# Patient Record
Sex: FEMALE | Race: WHITE | NOT HISPANIC OR LATINO | URBAN - METROPOLITAN AREA
[De-identification: names, ages, dates, MRNs, and addresses within clinical notes are randomized per-mention and may not be internally consistent; named-entity substitution may affect disease eponyms.]

---

## 2019-01-10 ENCOUNTER — EMERGENCY (EMERGENCY)
Facility: HOSPITAL | Age: 69
LOS: 1 days | Discharge: HOME | End: 2019-01-10
Admitting: EMERGENCY MEDICINE
Payer: MEDICARE

## 2019-01-10 VITALS
OXYGEN SATURATION: 98 % | DIASTOLIC BLOOD PRESSURE: 72 MMHG | SYSTOLIC BLOOD PRESSURE: 167 MMHG | RESPIRATION RATE: 18 BRPM | HEART RATE: 80 BPM | TEMPERATURE: 98 F

## 2019-01-10 PROCEDURE — 99283 EMERGENCY DEPT VISIT LOW MDM: CPT

## 2019-01-10 RX ORDER — AZITHROMYCIN 500 MG/1
1 TABLET, FILM COATED ORAL
Qty: 6 | Refills: 0 | OUTPATIENT
Start: 2019-01-10 | End: 2019-01-14

## 2019-01-10 NOTE — ED ADULT NURSE NOTE - NSIMPLEMENTINTERV_GEN_ALL_ED
Implemented All Universal Safety Interventions:  [facility] to call system. Call bell, personal items and telephone within reach. Instruct patient to call for assistance. Room bathroom lighting operational. Non-slip footwear when patient is off stretcher. Physically safe environment: no spills, clutter or unnecessary equipment. Stretcher in lowest position, wheels locked, appropriate side rails in place.

## 2019-01-10 NOTE — ED PROVIDER NOTE - OBJECTIVE STATEMENT
67 y/o female with PMHx of asthma (on albuterol inhaler, uses nebulizer and Prednisone as needed), hypothyroidism, and bronchitis presents to ED c/o cough for the past 2 weeks. Patient reports productive cough with yellow sputum with associated wheezing and slight hoarseness but no fever or chills. Also reports an episode of emesis induced by coughing. States she has used albuterol inhaler, nebulizer, Prednisone, NyQuil, and Mucinex with slight symptomatic relief, but symptoms have persisted. Denies any nausea and CP. Reports that she gets bronchitis every year around this time.

## 2019-01-10 NOTE — ED PROVIDER NOTE - MEDICAL DECISION MAKING DETAILS
Never
URI, bronchitis. Patient is afebrile, well-appearing, VS normal. Will give steroids, cough meds, and Z-Murray, advised f/u with PMD.

## 2019-01-10 NOTE — ED PROVIDER NOTE - CONTEXT
pt states that she gets bronchitis around this time every year, has hx of asthma/known exposure (describe)

## 2019-01-10 NOTE — ED ADULT NURSE NOTE - OBJECTIVE STATEMENT
c/o productive cough x 2 weeks. denies fevers/chills, CP, trouble breathing. pt in NAD, resting comfortably, even nonlabored breathing, speaking in full sentences. will continue to monitor.

## 2019-01-14 DIAGNOSIS — Z88.0 ALLERGY STATUS TO PENICILLIN: ICD-10-CM

## 2019-01-14 DIAGNOSIS — E03.9 HYPOTHYROIDISM, UNSPECIFIED: ICD-10-CM

## 2019-01-14 DIAGNOSIS — R05 COUGH: ICD-10-CM

## 2019-01-14 DIAGNOSIS — J40 BRONCHITIS, NOT SPECIFIED AS ACUTE OR CHRONIC: ICD-10-CM

## 2019-01-14 DIAGNOSIS — Z91.040 LATEX ALLERGY STATUS: ICD-10-CM

## 2019-07-08 ENCOUNTER — HOSPITAL ENCOUNTER (OUTPATIENT)
Dept: PHYSICAL THERAPY | Age: 69
Setting detail: THERAPIES SERIES
Discharge: HOME | End: 2019-07-08
Payer: MEDICARE

## 2019-07-08 PROCEDURE — 97016 VASOPNEUMATIC DEVICE THERAPY: CPT

## 2019-07-08 PROCEDURE — 97161 PT EVAL LOW COMPLEX 20 MIN: CPT

## 2019-07-08 PROCEDURE — 97110 THERAPEUTIC EXERCISES: CPT

## 2019-07-08 NOTE — PLAN OF CARE
[address] [facility], [address] [facility] [place], [address] [facility] [address]  Phone: [phone]   Fax:     [phone]       Physical Therapy Certification    Dear Referring Practitioner: Dr. Higinio Hamman,    We had the pleasure of evaluating the following patient for physical therapy services at [address] [place] [address]. A summary of our findings can be found in the initial assessment below. This includes our plan of care. If you have any questions or concerns regarding these findings, please do not hesitate to contact me at the office phone number checked above. Thank you for the referral.       Physician Signature:_______________________________Date:__________________  By signing above (or electronic signature), therapists plan is approved by physician      Patient: Miles Briones   : 1950   MRN: [id number]  Referring Physician: Referring Practitioner: Dr. Higinio Hamman      Evaluation Date: 2019      Medical Diagnosis Information:  Diagnosis: L knee OA M17.12  s/p L TKA  SX: 19  Treatment Diagnosis: L knee pain M25.562                                         Insurance information: PT Insurance Information: Medicare     Precautions/ Contra-indications: s/p L TKA 19  Latex Allergy:  [x]NO      []YES  Preferred Language for Healthcare:   [x]English       []other:    SUBJECTIVE: Patient stated complaint: Had L TKA 19 in [place]. Had 2 PT sessions prior to coming to [place], has been doing some bending since but stated no other exercises. Is , usually walks most of the day. Off work and staying with friends/family in [facility] plans to return to [place] labor day weekend.      Relevant Medical History: B knee scopes , R hip surgery vessel to hip, R RTC repair   Functional Disability Index: LEFS 83% disability    Pain Scale: 6-7/10  Easing factors: rest, ice  Provocative factors: bending    Type: [x]Constant   []Intermittent  []Radiating []Localized []other:     Numbness/Tingling: in L foot at times     Occupation/School:     Living Status/Prior Level of Function: Independent with ADLs and IADLs. OBJECTIVE:     ROM LEFT RIGHT   HIP Flex     HIP Abd     HIP Ext     HIP IR     HIP ER     Knee ext 0 hyper   Knee Flex 92 125   Ankle PF     Ankle DF     Ankle In     Ankle Ev     Strength  LEFT RIGHT   HIP Flexors SLR with lag    HIP Abductors     HIP Ext     Hip ER     Knee EXT (quad) fair    Knee Flex (HS)     Ankle DF     Ankle PF     Ankle Inv     Ankle EV          Circumference  Mid apex  7 cm prox             Reflexes/Sensation:    [x]Dermatomes/Myotomes intact    [x]Reflexes equal and normal bilaterally   []Other:    Joint mobility:    []Normal    []Hypo   []Hyper    Palpation: non TTP per pt    Functional Mobility/Transfers: I with transfers, difficulty with car transfers    Posture: rolled shoulder, slight forward flexed posture    Bandages/Dressings/Incisions: healed    Gait: (include devices/WB status) I with WW, step through antalgic gait favors RLE    Orthopedic Special Tests: NA                       [x] Patient history, allergies, meds reviewed. Medical chart reviewed. See intake form. Review Of Systems (ROS):  [x]Performed Review of systems (Integumentary, CardioPulmonary, Neurological) by intake and observation. Intake form has been scanned into medical record. Patient has been instructed to contact their primary care physician regarding ROS issues if not already being addressed at this time.       Co-morbidities/Complexities (which will affect course of rehabilitation):    []None           Arthritic conditions   []Rheumatoid arthritis (M05.9)  []Osteoarthritis (M19.91)   Cardiovascular conditions   [x]Hypertension (I10)  []Hyperlipidemia (E78.5)  []Angina pectoris (I20)  []Atherosclerosis (I70)   Musculoskeletal conditions   []Disc

## 2019-07-08 NOTE — FLOWSHEET NOTE
[address] [place] [address] [facility]    Physical Therapy Daily Treatment Note  Date:  2019    Patient Name:  Gunner Goss    :  1950  MRN: [id number]  Restrictions/Precautions:    Medical/Treatment Diagnosis Information:  · Diagnosis: L knee OA M17.12  s/p L TKA  SX: 19  · Treatment Diagnosis: L knee pain R68.245  Insurance/Certification information:  PT Insurance Information: Medicare  Physician Information:  Referring Practitioner: Dr. Hulen Scheuermann of care signed (Y/N):     Date of Patient follow up with Physician:     G-Code (if applicable):      Date G-Code Applied:      LEFS 83% disability    Progress Note: [x]  Yes  []  No  Next due by: Visit #10       Latex Allergy:  []NO      [x]YES   Preferred Language for Healthcare:   [x]English       []other:    Visit # Insurance Allowable   1 Med Nec     Pain level:  6-7/10     SUBJECTIVE:  See eval    OBJECTIVE: See eval  Observation:   Test measurements:      RESTRICTIONS/PRECAUTIONS:  s/p L TKA  SX: 19    Exercises/Interventions:     Therapeutic Ex Sets/sec Reps Notes HEP   Bike ROM  Calf stretch belt 6'  30\"   3     HS stretch long sitting 30\" 3     Quad sets 10\" 10     Heel slides  Self knee flexion self mob in chair  5   Reviewed    SLR  10     SSLR  10     LAQ  10                                                             Pt education 8'  HEP with progression, goals of PT, focus on ROM initially, use of ice/elevation- pt stated understanding     Manual Intervention                                                 NMR re-education                                                                          Therapeutic Exercise and NMR EXR  [x] (98904) Provided verbal/tactile cueing for activities related to strengthening, flexibility, endurance, ROM for improvements in LE, proximal hip, and core control with self care, mobility, lifting, ambulation.  [] (94419) Provided verbal/tactile cueing for activities

## 2019-07-12 ENCOUNTER — HOSPITAL ENCOUNTER (OUTPATIENT)
Dept: PHYSICAL THERAPY | Age: 69
Setting detail: THERAPIES SERIES
Discharge: HOME | End: 2019-07-12
Payer: MEDICARE

## 2019-07-12 PROCEDURE — 97112 NEUROMUSCULAR REEDUCATION: CPT

## 2019-07-12 PROCEDURE — 97110 THERAPEUTIC EXERCISES: CPT

## 2019-07-12 PROCEDURE — 97016 VASOPNEUMATIC DEVICE THERAPY: CPT

## 2019-07-12 NOTE — FLOWSHEET NOTE
verbal/tactile cueing for activities related to strengthening, flexibility, endurance, ROM for improvements in LE, proximal hip, and core control with self care, mobility, lifting, ambulation.  [] (79186) Provided verbal/tactile cueing for activities related to improving balance, coordination, kinesthetic sense, posture, motor skill, proprioception  to assist with LE, proximal hip, and core control in self care, mobility, lifting, ambulation and eccentric single leg control.      NMR and Therapeutic Activities:    [x] (59499 or 50377) Provided verbal/tactile cueing for activities related to improving balance, coordination, kinesthetic sense, posture, motor skill, proprioception and motor activation to allow for proper function of core, proximal hip and LE with self care and ADLs  [] (41831) Gait Re-education- Provided training and instruction to the patient for proper LE, core and proximal hip recruitment and positioning and eccentric body weight control with ambulation re-education including up and down stairs     Home Exercise Program:    [x] (35425) Reviewed/Progressed HEP activities related to strengthening, flexibility, endurance, ROM of core, proximal hip and LE for functional self-care, mobility, lifting and ambulation/stair navigation   [] (45175)Reviewed/Progressed HEP activities related to improving balance, coordination, kinesthetic sense, posture, motor skill, proprioception of core, proximal hip and LE for self care, mobility, lifting, and ambulation/stair navigation      Manual Treatments:  PROM / STM / Oscillations-Mobs:  G-I, II, III, IV (PA's, Inf., Post.)  [] (18042) Provided manual therapy to mobilize LE, proximal hip and/or LS spine soft tissue/joints for the purpose of modulating pain, promoting relaxation,  increasing ROM, reducing/eliminating soft tissue swelling/inflammation/restriction, improving soft tissue extensibility and allowing for proper ROM for normal function with self care, some ex without complaints. Added to HEP.     Treatment/Activity Tolerance:  [x] Patient tolerated treatment well [] Patient limited by fatique  [x] Patient limited by pain  [] Patient limited by other medical complications  [] Other:     Prognosis: [x] Good [] Fair  [] Poor    Patient Requires Follow-up: [x] Yes  [] No    PLAN: See eval  [x] Continue per plan of care [] Alter current plan (see comments)  [] Plan of care initiated [] Hold pending MD visit [] Discharge    Electronically signed by: Jona Eldridge PT, DPT

## 2019-07-15 ENCOUNTER — HOSPITAL ENCOUNTER (OUTPATIENT)
Dept: PHYSICAL THERAPY | Age: 69
Setting detail: THERAPIES SERIES
Discharge: HOME | End: 2019-07-15
Payer: MEDICARE

## 2019-07-15 PROCEDURE — 97016 VASOPNEUMATIC DEVICE THERAPY: CPT

## 2019-07-15 PROCEDURE — 97112 NEUROMUSCULAR REEDUCATION: CPT

## 2019-07-15 PROCEDURE — 97110 THERAPEUTIC EXERCISES: CPT

## 2019-07-18 ENCOUNTER — APPOINTMENT (OUTPATIENT)
Dept: PHYSICAL THERAPY | Age: 69
End: 2019-07-18
Payer: MEDICARE

## 2019-07-22 ENCOUNTER — APPOINTMENT (OUTPATIENT)
Dept: PHYSICAL THERAPY | Age: 69
End: 2019-07-22
Payer: MEDICARE

## 2019-07-23 ENCOUNTER — HOSPITAL ENCOUNTER (OUTPATIENT)
Dept: PHYSICAL THERAPY | Age: 69
Setting detail: THERAPIES SERIES
Discharge: HOME | End: 2019-07-23
Payer: MEDICARE

## 2019-07-23 PROCEDURE — 97112 NEUROMUSCULAR REEDUCATION: CPT

## 2019-07-23 PROCEDURE — 97110 THERAPEUTIC EXERCISES: CPT

## 2019-07-25 ENCOUNTER — HOSPITAL ENCOUNTER (OUTPATIENT)
Dept: PHYSICAL THERAPY | Age: 69
Setting detail: THERAPIES SERIES
Discharge: HOME | End: 2019-07-25
Payer: MEDICARE

## 2019-07-25 PROCEDURE — 97110 THERAPEUTIC EXERCISES: CPT

## 2019-07-25 PROCEDURE — 97016 VASOPNEUMATIC DEVICE THERAPY: CPT

## 2019-07-25 PROCEDURE — 97112 NEUROMUSCULAR REEDUCATION: CPT

## 2019-07-29 ENCOUNTER — HOSPITAL ENCOUNTER (OUTPATIENT)
Dept: PHYSICAL THERAPY | Age: 69
Setting detail: THERAPIES SERIES
Discharge: HOME | End: 2019-07-29
Payer: MEDICARE

## 2019-07-29 PROCEDURE — 97112 NEUROMUSCULAR REEDUCATION: CPT

## 2019-07-29 PROCEDURE — 97110 THERAPEUTIC EXERCISES: CPT

## 2019-07-29 PROCEDURE — 97016 VASOPNEUMATIC DEVICE THERAPY: CPT

## 2019-07-29 NOTE — FLOWSHEET NOTE
[address] [place] [address] [facility]    Physical Therapy Daily Treatment Note  Date:  2019    Patient Name:  Italo Hurtado    :  1950  MRN: [id number]  Restrictions/Precautions:    Medical/Treatment Diagnosis Information:  · Diagnosis: L knee OA M17.12  s/p L TKA  SX: 19  · Treatment Diagnosis: L knee pain O95.932  Insurance/Certification information:  PT Insurance Information: Medicare  Physician Information:  Referring Practitioner: Dr. Liliam Zeng of care signed (Y/N):     Date of Patient follow up with Physician:     G-Code (if applicable):      Date G-Code Applied:      LEFS 83% disability    Progress Note: [x]  Yes  []  No  Next due by: Visit #10       Latex Allergy:  []NO      [x]YES   Preferred Language for Healthcare:   [x]English       []other:    Visit # Insurance Allowable   6 Med Nec     Pain level:  3/10     SUBJECTIVE:    Knee feels pretty good minimal complaints.      OBJECTIVE: See eval  Observation:   Test measurements:  0-114 19    RESTRICTIONS/PRECAUTIONS:  s/p L TKA  SX: 19    Exercises/Interventions:     Therapeutic Ex Sets/sec Reps Notes HEP   Bike ROM  Calf stretch belt 6'  30\"   3     HS stretch long sitting 30\" 3     Quad sets 10\"      Heel slides  Self knee flexion self mob in chair  5   Reviewed    SLR      SSLR  Bridge        Leg press  SL leg press   Knee extension  HS curl  Step ups  Single leg balance  Calf stretch incline/HR's   KETURAH ABD/EXT  TKE 65#  40#  10#  20#  6\"  5\"  30\"x3  30#  35# 30  20  30  30  20  10  3x10  2x15ea  30               Gait training  10'  SBA with SPC, max cues for sequencing, decreasing antalgic gait, safety                                                     Pt education 8'  HEP with progression, goals of PT, focus on ROM initially, use of ice/elevation- pt stated understanding     Manual Intervention       Patella mobs 5'                                         NMR re-education Therapeutic Exercise and NMR EXR  [x] (38390) Provided verbal/tactile cueing for activities related to strengthening, flexibility, endurance, ROM for improvements in LE, proximal hip, and core control with self care, mobility, lifting, ambulation.  [] (58717) Provided verbal/tactile cueing for activities related to improving balance, coordination, kinesthetic sense, posture, motor skill, proprioception  to assist with LE, proximal hip, and core control in self care, mobility, lifting, ambulation and eccentric single leg control.      NMR and Therapeutic Activities:    [x] (22827 or 36063) Provided verbal/tactile cueing for activities related to improving balance, coordination, kinesthetic sense, posture, motor skill, proprioception and motor activation to allow for proper function of core, proximal hip and LE with self care and ADLs  [] (78649) Gait Re-education- Provided training and instruction to the patient for proper LE, core and proximal hip recruitment and positioning and eccentric body weight control with ambulation re-education including up and down stairs     Home Exercise Program:    [x] (25435) Reviewed/Progressed HEP activities related to strengthening, flexibility, endurance, ROM of core, proximal hip and LE for functional self-care, mobility, lifting and ambulation/stair navigation   [] (16457)Reviewed/Progressed HEP activities related to improving balance, coordination, kinesthetic sense, posture, motor skill, proprioception of core, proximal hip and LE for self care, mobility, lifting, and ambulation/stair navigation      Manual Treatments:  PROM / STM / Oscillations-Mobs:  G-I, II, III, IV (PA's, Inf., Post.)  [] (80152) Provided manual therapy to mobilize LE, proximal hip and/or LS spine soft tissue/joints for the purpose of modulating pain, promoting relaxation,  increasing ROM, reducing/eliminating soft tissue 20 Hour(s) 35 Minute(s) implemented, too soon to assess goals progression  [] Other:     ASSESSMENT:  ROM gradually progressing. Held knee ext 2/2 pt report of pain when attempted, performed the rest of routine without complaints. Pt reported it is impossible for her to do ex at home 2/2 weather and difficulty breathing outside, reviewed techniques for increased compliance.     Treatment/Activity Tolerance:  [x] Patient tolerated treatment well [] Patient limited by fatique  [x] Patient limited by pain  [] Patient limited by other medical complications  [] Other:     Prognosis: [x] Good [] Fair  [] Poor    Patient Requires Follow-up: [x] Yes  [] No    PLAN: See eval  [x] Continue per plan of care [] Alter current plan (see comments)  [] Plan of care initiated [] Hold pending MD visit [] Discharge    Electronically signed by: Lazaro Pelayo PT, DPT

## 2019-08-01 ENCOUNTER — APPOINTMENT (OUTPATIENT)
Dept: PHYSICAL THERAPY | Age: 69
End: 2019-08-01
Payer: MEDICARE

## 2019-08-01 ENCOUNTER — HOSPITAL ENCOUNTER (OUTPATIENT)
Dept: PHYSICAL THERAPY | Age: 69
Setting detail: THERAPIES SERIES
Discharge: HOME | End: 2019-08-01
Payer: MEDICARE

## 2019-08-01 PROCEDURE — 97110 THERAPEUTIC EXERCISES: CPT

## 2019-08-01 PROCEDURE — 97016 VASOPNEUMATIC DEVICE THERAPY: CPT

## 2019-08-01 NOTE — FLOWSHEET NOTE
[address] [place] [address] [facility]    Physical Therapy Daily Treatment Note  Date:  2019    Patient Name:  Kvng Posey    :  1950  MRN: [id number]  Restrictions/Precautions:    Medical/Treatment Diagnosis Information:  · Diagnosis: L knee OA M17.12  s/p L TKA  SX: 19  · Treatment Diagnosis: L knee pain V57.240  Insurance/Certification information:  PT Insurance Information: Medicare  Physician Information:  Referring Practitioner: Dr. Theodore Morfin of care signed (Y/N):     Date of Patient follow up with Physician:     G-Code (if applicable):      Date G-Code Applied:      LEFS 83% disability    Progress Note: [x]  Yes  []  No  Next due by: Visit #10       Latex Allergy:  []NO      [x]YES   Preferred Language for Healthcare:   [x]English       []other:    Visit # Insurance Allowable   7 Med Nec     Pain level:  3/10     SUBJECTIVE:    Knee feels pretty good minimal complaints. Just some dizziness that started last night but feels better today.     OBJECTIVE: See eval  Observation:   Test measurements:  0-114 19    RESTRICTIONS/PRECAUTIONS:  s/p L TKA  SX: 19    Exercises/Interventions:     Therapeutic Ex Sets/sec Reps Notes HEP   Bike ROM  Calf stretch belt 6'  30\"   3     HS stretch long sitting 30\" 3     Quad sets 10\"      Heel slides  Self knee flexion self mob in chair  5   Reviewed    SLR      SSLR  Bridge        Leg press  SL leg press   Knee extension  HS curl  Step ups  Single leg balance  Calf stretch incline/HR's   KETURAH ABD/EXT  TKE 70#  45#  10#  20#  6\"  5\"  30\"x3  30#  35# 30  20  30  30  20  10  3x10  2x15ea  30               Gait training  10'  SBA with SPC, max cues for sequencing, decreasing antalgic gait, safety                                                     Pt education 8'  HEP with progression, goals of PT, focus on ROM initially, use of ice/elevation- pt stated understanding     Manual Intervention       Patella mobs 5' ROM, reducing/eliminating soft tissue swelling/inflammation/restriction, improving soft tissue extensibility and allowing for proper ROM for normal function with self care, mobility, lifting and ambulation. Modalities:  Vasopneumatic Gameready treatment for effusion of L knee and cryotherapy for 10 minutes at medium pressure. CP 10'    Charges:  Timed Code Treatment Minutes: 30   Total Treatment Minutes: 40     [] EVAL  [x] GF(24471) x  2   [] IONTO  [] NMR (24446) x      [x] VASO  [] Manual (29224) x       [] Other:  [] TA x       [] Mech Traction (07527)  [] ES(attended) (93039)      [] ES (un) (56203):     GOALS:   Patient stated goal: Return to dog walking. Therapist goals for Patient:   Short Term Goals: To be achieved in: 2 weeks  1. Independent in HEP and progression per patient tolerance, in order to prevent re-injury. 2. Patient will have a decrease in pain to facilitate improvement in movement, function, and ADLs as indicated by Functional Deficits. Long Term Goals: To be achieved in: 12 weeks  1. Disability index score of 39% or less for the LEFS to assist with reaching prior level of function. 2. Patient will demonstrate increased AROM to 120 flex, 0 ext to allow for proper joint functioning as indicated by patients Functional Deficits. 3. Patient will demonstrate an increase in Strength to good proximal hip strength and control, within 5lb HHD in LE to allow for proper functional mobility as indicated by patients Functional Deficits. 4. Patient will return to functional activities including ascend/descend 1 flight of stairs I no AD without increased symptoms or restriction. 5. Patient will walk 30 mins I no AD without restriction to return to dog walking activities. (Patient specific)    Progression Towards Functional goals:  [] Patient is progressing as expected towards functional goals listed. [] Progression is slowed due to complexities listed.   [] Progression has been slowed

## 2019-08-09 ENCOUNTER — HOSPITAL ENCOUNTER (OUTPATIENT)
Dept: PHYSICAL THERAPY | Age: 69
Setting detail: THERAPIES SERIES
Discharge: HOME | End: 2019-08-09
Payer: MEDICARE

## 2019-08-09 PROCEDURE — 97112 NEUROMUSCULAR REEDUCATION: CPT

## 2019-08-09 PROCEDURE — 97110 THERAPEUTIC EXERCISES: CPT

## 2019-08-09 NOTE — FLOWSHEET NOTE
re-education                                                                          Therapeutic Exercise and NMR EXR  [x] (99398) Provided verbal/tactile cueing for activities related to strengthening, flexibility, endurance, ROM for improvements in LE, proximal hip, and core control with self care, mobility, lifting, ambulation.  [] (14655) Provided verbal/tactile cueing for activities related to improving balance, coordination, kinesthetic sense, posture, motor skill, proprioception  to assist with LE, proximal hip, and core control in self care, mobility, lifting, ambulation and eccentric single leg control.      NMR and Therapeutic Activities:    [x] (52352 or 41136) Provided verbal/tactile cueing for activities related to improving balance, coordination, kinesthetic sense, posture, motor skill, proprioception and motor activation to allow for proper function of core, proximal hip and LE with self care and ADLs  [] (77878) Gait Re-education- Provided training and instruction to the patient for proper LE, core and proximal hip recruitment and positioning and eccentric body weight control with ambulation re-education including up and down stairs     Home Exercise Program:    [x] (08656) Reviewed/Progressed HEP activities related to strengthening, flexibility, endurance, ROM of core, proximal hip and LE for functional self-care, mobility, lifting and ambulation/stair navigation   [] (70053)Reviewed/Progressed HEP activities related to improving balance, coordination, kinesthetic sense, posture, motor skill, proprioception of core, proximal hip and LE for self care, mobility, lifting, and ambulation/stair navigation      Manual Treatments:  PROM / STM / Oscillations-Mobs:  G-I, II, III, IV (PA's, Inf., Post.)  [] (53362) Provided manual therapy to mobilize LE, proximal hip and/or LS spine soft tissue/joints for the purpose of modulating pain, promoting relaxation,  increasing ROM, reducing/eliminating soft tissue swelling/inflammation/restriction, improving soft tissue extensibility and allowing for proper ROM for normal function with self care, mobility, lifting and ambulation. Modalities:  CP 10    Charges:  Timed Code Treatment Minutes: 40   Total Treatment Minutes: 50     [] EVAL  [x] HW(45339) x  2   [] IONTO  [x] NMR (82726) x      [] VASO  [] Manual (26666) x       [] Other:  [] TA x       [] Mech Traction (23905)  [] ES(attended) (94817)      [] ES (un) (30205):     GOALS:   Patient stated goal: Return to dog walking. Therapist goals for Patient:   Short Term Goals: To be achieved in: 2 weeks  1. Independent in HEP and progression per patient tolerance, in order to prevent re-injury. 2. Patient will have a decrease in pain to facilitate improvement in movement, function, and ADLs as indicated by Functional Deficits. Long Term Goals: To be achieved in: 12 weeks  1. Disability index score of 39% or less for the LEFS to assist with reaching prior level of function. 2. Patient will demonstrate increased AROM to 120 flex, 0 ext to allow for proper joint functioning as indicated by patients Functional Deficits. 3. Patient will demonstrate an increase in Strength to good proximal hip strength and control, within 5lb HHD in LE to allow for proper functional mobility as indicated by patients Functional Deficits. 4. Patient will return to functional activities including ascend/descend 1 flight of stairs I no AD without increased symptoms or restriction. 5. Patient will walk 30 mins I no AD without restriction to return to dog walking activities. (Patient specific)    Progression Towards Functional goals:  [] Patient is progressing as expected towards functional goals listed. [] Progression is slowed due to complexities listed. [] Progression has been slowed due to co-morbidities. [x] Plan just implemented, too soon to assess goals progression  [] Other:     ASSESSMENT:  ROM gradually progressing.  Pt

## 2019-08-12 ENCOUNTER — HOSPITAL ENCOUNTER (OUTPATIENT)
Dept: PHYSICAL THERAPY | Age: 69
Setting detail: THERAPIES SERIES
Discharge: HOME | End: 2019-08-12
Payer: MEDICARE

## 2019-08-12 PROCEDURE — 97110 THERAPEUTIC EXERCISES: CPT

## 2019-08-12 PROCEDURE — 97112 NEUROMUSCULAR REEDUCATION: CPT

## 2019-08-12 NOTE — FLOWSHEET NOTE
[facility]    Physical Therapy Daily Treatment Note  Date:  2019    Patient Name:  Carla Burnham    :  1950  MRN: [id number]  Restrictions/Precautions:    Medical/Treatment Diagnosis Information:  · Diagnosis: L knee OA M17.12  s/p L TKA  SX: 19  · Treatment Diagnosis: L knee pain S96.843  Insurance/Certification information:  PT Insurance Information: Medicare  Physician Information:  Referring Practitioner: Dr. Kari Tian of care signed (Y/N):     Date of Patient follow up with Physician:     G-Code (if applicable):      Date G-Code Applied:      LEFS 83% disability    Progress Note: [x]  Yes  []  No  Next due by: Visit #10       Latex Allergy:  []NO      [x]YES   Preferred Language for Healthcare:   [x]English       []other:    Visit # Insurance Allowable   9 Med Nec     Pain level:  3/10     SUBJECTIVE:    Pt 10 mins late. Walked a flight of steps over the weekend and felt good going up a little difficulty going down.     OBJECTIVE: See eval  Observation:   Test measurements:  0-119 19    RESTRICTIONS/PRECAUTIONS:  s/p L TKA  SX: 19    Exercises/Interventions:     Therapeutic Ex Sets/sec Reps Notes HEP   Bike ROM  Calf stretch belt 6'  30\"   3     HS stretch long sitting 30\" 3     Quad sets 10\"      Heel slides  Self knee flexion self mob in chair  5   Reviewed    SLR      SSLR  Bridge        Leg press  SL leg press   Knee extension  HS curl  Step ups  Single leg balance  Calf stretch incline/HR's   KETURAH ABD/EXT  TKE 70#  45#  20#  30#  6\"  10\"  30\"x3  35#  45# 30  20  30  30  30  10  3x10  30ea  30               Gait training  10'  , no AD, max cues for sequencing, decreasing antalgic gait, safety                                                     Pt education 8'  HEP with progression, goals of PT, focus on ROM initially, use of ice/elevation- pt stated understanding     Manual Intervention       Patella mobs

## 2019-08-14 ENCOUNTER — HOSPITAL ENCOUNTER (OUTPATIENT)
Dept: PHYSICAL THERAPY | Age: 69
Setting detail: THERAPIES SERIES
Discharge: HOME | End: 2019-08-14
Payer: MEDICARE

## 2019-08-14 PROCEDURE — 97110 THERAPEUTIC EXERCISES: CPT

## 2019-08-14 PROCEDURE — 97112 NEUROMUSCULAR REEDUCATION: CPT

## 2019-08-14 NOTE — PLAN OF CARE
Continue per plan of care [] Alter current plan (see comments)  [] Plan of care initiated [] Hold pending MD visit [] Discharge    Electronically signed by: Dylan Ruth PT, DPT

## 2019-08-17 ENCOUNTER — APPOINTMENT (OUTPATIENT)
Dept: GENERAL RADIOLOGY | Age: 69
End: 2019-08-17
Payer: MEDICARE

## 2019-08-17 ENCOUNTER — HOSPITAL ENCOUNTER (EMERGENCY)
Age: 69
Discharge: HOME | End: 2019-08-17
Attending: EMERGENCY MEDICINE
Payer: MEDICARE

## 2019-08-17 VITALS
WEIGHT: 130 LBS | RESPIRATION RATE: 16 BRPM | SYSTOLIC BLOOD PRESSURE: 179 MMHG | TEMPERATURE: 98.5 F | DIASTOLIC BLOOD PRESSURE: 91 MMHG | OXYGEN SATURATION: 100 % | HEART RATE: 90 BPM

## 2019-08-17 DIAGNOSIS — M25.531 PAIN IN BOTH WRISTS: ICD-10-CM
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
## 2019-08-17 DIAGNOSIS — R03.0 ELEVATED BLOOD PRESSURE READING: ICD-10-CM

## 2019-08-17 DIAGNOSIS — T14.8XXA ABRASION: ICD-10-CM

## 2019-08-17 DIAGNOSIS — T07.XXXA MULTIPLE CONTUSIONS: Primary | ICD-10-CM

## 2019-08-17 DIAGNOSIS — M25.532 PAIN IN BOTH WRISTS: ICD-10-CM

## 2019-08-17 PROCEDURE — 99284 EMERGENCY DEPT VISIT MOD MDM: CPT

## 2019-08-17 PROCEDURE — 73130 X-RAY EXAM OF HAND: CPT

## 2019-08-17 PROCEDURE — 6370000000 HC RX 637 (ALT 250 FOR IP): Performed by: EMERGENCY MEDICINE

## 2019-08-17 PROCEDURE — 73110 X-RAY EXAM OF WRIST: CPT

## 2019-08-17 PROCEDURE — 6360000002 HC RX W HCPCS: Performed by: EMERGENCY MEDICINE

## 2019-08-17 PROCEDURE — 90471 IMMUNIZATION ADMIN: CPT | Performed by: EMERGENCY MEDICINE

## 2019-08-17 PROCEDURE — 90715 TDAP VACCINE 7 YRS/> IM: CPT | Performed by: EMERGENCY MEDICINE

## 2019-08-17 RX ORDER — NAPROXEN 500 MG/1
500 TABLET ORAL ONCE
Status: COMPLETED | OUTPATIENT
Start: 2019-08-17 | End: 2019-08-17

## 2019-08-17 RX ORDER — OMEPRAZOLE 20 MG/1
20 CAPSULE, DELAYED RELEASE ORAL DAILY
COMMUNITY

## 2019-08-17 RX ORDER — HYDROCODONE BITARTRATE AND ACETAMINOPHEN 5; 325 MG/1; MG/1
1 TABLET ORAL ONCE
Status: COMPLETED | OUTPATIENT
Start: 2019-08-17 | End: 2019-08-17

## 2019-08-17 RX ADMIN — HYDROCODONE BITARTRATE AND ACETAMINOPHEN 1 TABLET: 5; 325 TABLET ORAL at 19:46

## 2019-08-17 RX ADMIN — TETANUS TOXOID, REDUCED DIPHTHERIA TOXOID AND ACELLULAR PERTUSSIS VACCINE, ADSORBED 0.5 ML: 5; 2.5; 8; 8; 2.5 SUSPENSION INTRAMUSCULAR at 19:25

## 2019-08-17 RX ADMIN — NAPROXEN 500 MG: 500 TABLET ORAL at 19:25

## 2019-08-17 ASSESSMENT — PAIN SCALES - GENERAL: PAINLEVEL_OUTOF10: 10

## 2019-08-17 NOTE — ED PROVIDER NOTES
[address] ED  EMERGENCY DEPARTMENT ENCOUNTER        Pt Name: Zak Rai  MRN: [id number]  [place] 1950  Date of evaluation: 8/17/2019  Provider: BRAVO Cadet CNP  PCP: No primary care provider on file. This patient was seen and evaluated by the attending physician Dr. Marko Coronel. CHIEF COMPLAINT       Chief Complaint   Patient presents with    Fall     pt fell. lacs to both arms and pain in left wrist.       HISTORY OF PRESENT ILLNESS   (Location/Symptom, Timing/Onset, Context/Setting, Quality, Duration, Modifying Factors, Severity)  Note limiting factors. Zak Rai is a 76 y.o. female who presents for evaluation of left wrist/hand pain. Patient states that approximately an hour ago she was walking outside lost her balance and fell into some brush. States that she caught herself with her left arm. States that she has multiple abrasions/lacerations to her bilateral forearms, complaining of left wrist and hand pain. Patient denies other injuries at this time. Patient states she did not take any analgesics prior to arrival.  Patient denies hitting her head, chest pain, shortness of breath, nausea, vomiting, or diarrhea. Nursing Notes were all reviewed and agreed with or any disagreements were addressed  in the HPI. REVIEW OF SYSTEMS    (2-9 systems for level 4, 10 or more for level 5)     Review of Systems    Positives and Pertinent negatives as per HPI. Except as noted abovein the ROS, all other systems were reviewed and negative. PAST MEDICAL HISTORY     Past Medical History:   Diagnosis Date    Hypertension     Thyroid disease          SURGICAL HISTORY   History reviewed. No pertinent surgical history.       CURRENTMEDICATIONS       Previous Medications    LEVOTHYROXINE SODIUM PO    Take by mouth    LISINOPRIL PO    Take by mouth    OMEPRAZOLE (PRILOSEC) 20 MG DELAYED RELEASE CAPSULE    Take 20 mg by mouth daily         ALLERGIES DIAGNOSIS/MDM:   Vitals:    Vitals:    08/17/19 1827   BP: (!) 179/91   Pulse: 90   Resp: 16   Temp: 98.5 °F (36.9 °C)   TempSrc: Oral   SpO2: 100%   Weight: 130 lb (59 kg)       Patient was given thefollowing medications:  Medications   naproxen (NAPROSYN) tablet 500 mg (500 mg Oral Given 8/17/19 1925)   Tetanus-Diphth-Acell Pertussis (BOOSTRIX) injection 0.5 mL (0.5 mLs Intramuscular Given 8/17/19 1925)   HYDROcodone-acetaminophen (NORCO) 5-325 MG per tablet 1 tablet (1 tablet Oral Given 8/17/19 1946)     Patient is nontoxic, no apparent distress, lying in the bed. X-ray of left wrist and right hand ordered. Patient given naproxen for pain, patient declining narcotic pain meds at this time, and a tetanus shot since she is unaware when her last tetanus shot was. X-ray of left wrist without acute abnormality, soft tissue swelling of distal forearm. X-ray of right hand negative for acute fracture however, evidence of possible impacted distal radial fracture recommended for further evaluation. Right wrist x-ray ordered at this time. X-ray of right wrist positive for mildly impacted distal radial fracture however it appears to be subacute, no soft tissue swelling noted, no acute fractures identified. 1940 -patient crying and hyperventilating stating she cannot move her left hand at this time. Patient offered ice and Norco.  Patient informed that we are waiting for her x-ray. Norco ordered at this time. 2010 -Steri-Strips and skin glue applied to 2 skin tear/abrasions on left forearm and one skin tear/abrasion on right forearm. Patient states that she is feeling much better since receiving pain medication. 2105 -patient informed that her x-rays were negative. Patient states that she feels much better since receiving pain medication.   Bilateral wrist splints ordered, patient aware of discharge at this time, referral given for orthopedics, instructed to follow-up with primary care physician, and return

## 2019-08-18 NOTE — ED NOTES
Wrist splint applied to both left and right wrist.      [facility]  08/17/19 3898

## 2019-08-19 ENCOUNTER — HOSPITAL ENCOUNTER (OUTPATIENT)
Dept: PHYSICAL THERAPY | Age: 69
Setting detail: THERAPIES SERIES
End: 2019-08-19
Payer: MEDICARE

## 2019-08-19 ENCOUNTER — APPOINTMENT (OUTPATIENT)
Dept: PHYSICAL THERAPY | Age: 69
End: 2019-08-19
Payer: MEDICARE

## 2019-08-23 ENCOUNTER — APPOINTMENT (OUTPATIENT)
Dept: PHYSICAL THERAPY | Age: 69
End: 2019-08-23
Payer: MEDICARE

## 2020-10-13 ENCOUNTER — EMERGENCY (EMERGENCY)
Facility: HOSPITAL | Age: 70
LOS: 1 days | Discharge: HOME | End: 2020-10-13
Attending: EMERGENCY MEDICINE | Admitting: EMERGENCY MEDICINE
Payer: MEDICARE

## 2020-10-13 VITALS
TEMPERATURE: 98 F | WEIGHT: 164.91 LBS | RESPIRATION RATE: 16 BRPM | SYSTOLIC BLOOD PRESSURE: 157 MMHG | OXYGEN SATURATION: 98 % | DIASTOLIC BLOOD PRESSURE: 84 MMHG | HEIGHT: 60 IN | HEART RATE: 85 BPM

## 2020-10-13 DIAGNOSIS — Z20.828 CONTACT WITH AND (SUSPECTED) EXPOSURE TO OTHER VIRAL COMMUNICABLE DISEASES: ICD-10-CM

## 2020-10-13 DIAGNOSIS — Z91.040 LATEX ALLERGY STATUS: ICD-10-CM

## 2020-10-13 DIAGNOSIS — Z79.51 LONG TERM (CURRENT) USE OF INHALED STEROIDS: ICD-10-CM

## 2020-10-13 DIAGNOSIS — J45.909 UNSPECIFIED ASTHMA, UNCOMPLICATED: ICD-10-CM

## 2020-10-13 DIAGNOSIS — Z88.0 ALLERGY STATUS TO PENICILLIN: ICD-10-CM

## 2020-10-13 PROBLEM — J40 BRONCHITIS, NOT SPECIFIED AS ACUTE OR CHRONIC: Chronic | Status: ACTIVE | Noted: 2019-01-10

## 2020-10-13 PROBLEM — E03.9 HYPOTHYROIDISM, UNSPECIFIED: Chronic | Status: ACTIVE | Noted: 2019-01-10

## 2020-10-13 PROCEDURE — 99283 EMERGENCY DEPT VISIT LOW MDM: CPT | Mod: CS

## 2020-10-13 NOTE — ED PROVIDER NOTE - CLINICAL SUMMARY MEDICAL DECISION MAKING FREE TEXT BOX
Asymptomatic patient here for COVID screening. Risk of exposure is very low. Reviewed vitals and testing plan with the patient.  Encouraged to download the follow my health dennys for test results.

## 2020-10-13 NOTE — ED PROVIDER NOTE - OBJECTIVE STATEMENT
Pt is a 70 y/o female with a PMHx of asthma presenting to the ED requesting COVID-19 screening. Patient states she has not received a negative covid test previously, and obtained a flu shot x2 weeks ago. Patient is currently asymptomatic and has no other medical complaints at this time. Denies fever, chills, chest pain, SOB.

## 2020-10-14 LAB — SARS-COV-2 RNA SPEC QL NAA+PROBE: SIGNIFICANT CHANGE UP

## 2021-08-05 ENCOUNTER — HOSPITAL ENCOUNTER (OUTPATIENT)
Dept: PHYSICAL THERAPY | Age: 71
Setting detail: THERAPIES SERIES
Discharge: HOME | End: 2021-08-05
Payer: MEDICARE

## 2021-08-05 PROCEDURE — 97161 PT EVAL LOW COMPLEX 20 MIN: CPT

## 2021-08-05 PROCEDURE — 97140 MANUAL THERAPY 1/> REGIONS: CPT

## 2021-08-05 PROCEDURE — 97016 VASOPNEUMATIC DEVICE THERAPY: CPT

## 2021-08-05 PROCEDURE — 97110 THERAPEUTIC EXERCISES: CPT

## 2021-08-05 NOTE — FLOWSHEET NOTE
mm 8 min                                         NMR re-education (46759)   CUES NEEDED                                                                   Therapeutic Activity (50146)                                          [employer] access code:  [email or web address]           Therapeutic Exercise and NMR EXR  [x] (81970) Provided verbal/tactile cueing for activities related to strengthening, flexibility, endurance, ROM for improvements in LE, proximal hip, and core control with self care, mobility, lifting, ambulation. [x] (38752) Provided verbal/tactile cueing for activities related to improving balance, coordination, kinesthetic sense, posture, motor skill, proprioception to assist with LE, proximal hip, and core control in self-care, mobility, lifting, ambulation and eccentric single leg control.      NMR and Therapeutic Activities:    [x] (11040 or 21448) Provided verbal/tactile cueing for activities related to improving balance, coordination, kinesthetic sense, posture, motor skill, proprioception and motor activation to allow for proper function of core, proximal hip and LE with self-care and ADLs and functional mobility.   [] (62184) Gait Re-education- Provided training and instruction to the patient for proper LE, core and proximal hip recruitment and positioning and eccentric body weight control with ambulation re-education including up and down stairs     Home Exercise Program:    [x] (91244) Reviewed/Progressed HEP activities related to strengthening, flexibility, endurance, ROM of core, proximal hip and LE for functional self-care, mobility, lifting and ambulation/stair navigation   [] (44965) Reviewed/Progressed HEP activities related to improving balance, coordination, kinesthetic sense, posture, motor skill, proprioception of core, proximal hip and LE for self-care, mobility, lifting, and ambulation/stair navigation      Manual Treatments:  PROM / STM / Oscillations-Mobs:  G-I, II, III, IV (PA's, Inf., Post.)  [] (77734) Provided manual therapy to mobilize LE, proximal hip and/or LS spine soft tissue/joints for the purpose of modulating pain, promoting relaxation, increasing ROM, reducing/eliminating soft tissue swelling/inflammation/restriction, improving soft tissue extensibility and allowing for proper ROM for normal function with self-care, mobility, lifting and ambulation. Modalities:     [x] GAME READY (VASO)- for significant edema, swelling, pain control. Charges:  Timed Code Treatment Minutes: 25   Total Treatment Minutes:  48   BWC:  TE TIME:  NMR TIME:  MANUAL TIME:  UNTIMED MINUTES:  Medicare Total:           [x] EVAL (LOW) 00116 (typically 20 minutes face-to-face)  [] EVAL (MOD) 19765 (typically 30 minutes face-to-face)  [] EVAL (HIGH) 31471 (typically 45 minutes face-to-face)  [] RE-EVAL     [x] DK(97388) x     [] IONTO  [] NMR (34541) x     [x] VASO  [x] Manual (89454) x     [] Other:  [] TA x      [] Mech Traction (68164)  [] ES(attended) (51491)      [] ES (un) (18823):    ASSESSMENT:  See eval    GOALS:    Patient stated goal: Walk without pain. Therapist goals for Patient:   Short Term Goals: To be achieved in: 2 weeks  1. Independent in HEP and progression per patient tolerance, in order to prevent re-injury. [] Progressing: [] Met: [] Not Met: [] Adjusted     2. Patient will have a decrease in pain to facilitate improvement in movement, function, and ADLs as indicated by Functional Deficits. [] Progressing: [] Met: [] Not Met: [] Adjusted     Long Term Goals: To be achieved in: 12 weeks  1. Disability index score of 14% or less for the LEFS to assist with reaching prior level of function. [] Progressing: [] Met: [] Not Met: [] Adjusted     2. Patient will demonstrate increased AROM to 8 deg DF to allow for proper joint functioning as indicated by patients Functional Deficits. [] Progressing: [] Met: [] Not Met: [] Adjusted     3.  Patient will demonstrate an increase in Strength to good proximal hip strength and control, within 5lb HHD in LE to allow for proper functional mobility as indicated by patients Functional Deficits. [] Progressing: [] Met: [] Not Met: [] Adjusted     4. Patient will return to functional activities including walking 20-30 mins I no AD without increased symptoms or restriction. [] Progressing: [] Met: [] Not Met: [] Adjusted         Overall Progression Towards Functional goals/ Treatment Progress Update:  [] Patient is progressing as expected towards functional goals listed. [] Progression is slowed due to complexities/Impairments listed. [] Progression has been slowed due to co-morbidities. [x] Plan just implemented, too soon to assess goals progression <30days   [] Goals require adjustment due to lack of progress  [] Patient is not progressing as expected and requires additional follow up with physician  [] Other    Prognosis for POC: [x] Good [] Fair  [] Poor      Patient requires continued skilled intervention: [x] Yes  [] No    Treatment/Activity Tolerance:  [x] Patient able to complete treatment  [] Patient limited by fatigue  [] Patient limited by pain    [] Patient limited by other medical complications  [] Other:     Return to Play: (if applicable)   []  Stage 1: Intro to Strength   []  Stage 2: Return to Run and Strength   []  Stage 3: Return to Jump and Strength   []  Stage 4: Dynamic Strength and Agility   []  Stage 5: Sport Specific Training     []  Ready to Return to Play, Meets All Above Stages   []  Not Ready for Return to Sports   Comments:                          PLAN: See eval  [] Continue per plan of care [] Alter current plan (see comments above)  [x] Plan of care initiated [] Hold pending MD visit [] Discharge    Electronically signed by:  Mirian Goff PT    Note: If patient does not return for scheduled/ recommended follow up visits, this note will serve as a discharge from care along with most recent update on progress.

## 2021-08-05 NOTE — PLAN OF CARE
[address] [facility], [address] [place] [address], [address] [facility] [address]  Phone: [phone]   Fax:     [phone]       Physical Therapy Certification    Dear Referring Practitioner: Scarlet Daniels,    We had the pleasure of evaluating the following patient for physical therapy services at [address] [place] [address]. A summary of our findings can be found in the initial assessment below. This includes our plan of care. If you have any questions or concerns regarding these findings, please do not hesitate to contact me at the office phone number checked above. Thank you for the referral.       Physician Signature:_______________________________Date:__________________  By signing above (or electronic signature), therapists plan is approved by physician      Patient: Jennifer Lozoya   : 1950   MRN: [id number]  Referring Physician: Referring Practitioner: Scarlet Daniels      Evaluation Date: 2021      Medical Diagnosis Information:  Diagnosis: Left ankle pain M25. 572   Treatment Diagnosis: Left ankle pain M25. 572                                         Insurance information: PT Insurance Information: T[address]in [address] 142     Precautions/ Contra-indications: L TKA 2019      C-SSRS Triggered by Intake questionnaire (Past 2 wk assessment):   [x] No, Questionnaire did not trigger screening.   [] Yes, Patient intake triggered further evaluation      [] C-SSRS Screening completed  [] PCP notified via Plan of Care  [] Emergency services notified     Latex Allergy:  [x]NO      []YES  Preferred Language for Healthcare:   [x]English       []other:    SUBJECTIVE: Patient stated complaint: Pt reports insidious onset of left ankle pain gradual increase in symptoms. Stated pain is posterior achilles. Stated feels like sharp pain posterior ankle pain.      Relevant Medical History: B knee scopes 2013, R hip surgery vessel to hip, R RTC repair 2016; L TKA 6/12/19  Functional Disability Index:     Pain Scale: 5/10  Easing factors: rest  Provocative factors: walking      Type: []Constant   []Intermittent  []Radiating []Localized []other:     Numbness/Tingling: sometimes in L foot/ankle    Occupation/School:     Living Status/Prior Level of Function: Independent with ADLs and IADLs. OBJECTIVE:     ROM LEFT RIGHT   HIP Flex     HIP Abd     HIP Ext     HIP IR     HIP ER     Knee ext     Knee Flex     Ankle PF 55    Ankle DF 5    Ankle In 28    Ankle Ev 12    Strength  LEFT RIGHT   HIP Flexors     HIP Abductors     HIP Ext     Hip ER     Knee EXT (quad)     Knee Flex (HS)     Ankle DF 4-/5    Ankle PF 4-/5    Ankle Inv 4-/5    Ankle EV 3+/5         Circumference  Mid apex  7 cm prox             Reflexes/Sensation:    [x]Dermatomes/Myotomes intact    [x]Reflexes equal and normal bilaterally   []Other:    Joint mobility:     []Normal    []Hypo   []Hyper    Palpation: achilles insertion and distal tendon TTP    Functional Mobility/Transfers: I with transfers    Posture: WFL    Bandages/Dressings/Incisions: NA    Gait: (include devices/WB status) increased lateral sway, slight antalgic gait    Orthopedic Special Tests:                         [x] Patient history, allergies, meds reviewed. Medical chart reviewed. See intake form. Review Of Systems (ROS):  [x]Performed Review of systems (Integumentary, CardioPulmonary, Neurological) by intake and observation. Intake form has been scanned into medical record. Patient has been instructed to contact their primary care physician regarding ROS issues if not already being addressed at this time.       Co-morbidities/Complexities (which will affect course of rehabilitation):   []None           Arthritic conditions   []Rheumatoid arthritis (M05.9)  []Osteoarthritis (M19.91)   Cardiovascular conditions   [x]Hypertension (I10)  []Hyperlipidemia (E78.5)  []Angina pectoris (I20)  []Atherosclerosis (I70)   Musculoskeletal conditions   []Disc pathology   []Congenital spine pathologies   []Prior surgical intervention  []Osteoporosis (M81.8)  []Osteopenia (M85.8)   Endocrine conditions   []Hypothyroid (E03.9)  []Hyperthyroid Gastrointestinal conditions   []Constipation (L87.49)   Metabolic conditions   []Morbid obesity (E66.01)  []Diabetes type 1(E10.65) or 2 (E11.65)   []Neuropathy (G60.9)     Pulmonary conditions   [x]Asthma (J45)  []Coughing   []COPD (J44.9)   Psychological Disorders  []Anxiety (F41.9)  [x]Depression (F32.9)   []Other:   []Other:          Barriers to/and or personal factors that will affect rehab potential:              [x]Age  []Sex              []Motivation/Lack of Motivation                        [x]Co-Morbidities              []Cognitive Function, education/learning barriers              []Environmental, home barriers              [x]profession/work barriers  []past PT/medical experience  []other:  Justification:      Falls Risk Assessment (30 days):   [x] Falls Risk assessed and no intervention required.   [] Falls Risk assessed and Patient requires intervention due to being higher risk   TUG score (>12s at risk):     [] Falls education provided, including       G-Codes:       ASSESSMENT:    Functional Impairments:     []Noted lumbar/proximal hip/LE joint hypomobility   [x]Decreased LE functional ROM   [x]Decreased core/proximal hip strength and neuromuscular control   [x]Decreased LE functional strength   [x]Reduced balance/proprioceptive control   []other:      Functional Activity Limitations (from functional questionnaire and intake)   [x]Reduced ability to tolerate prolonged functional positions   [x]Reduced ability or difficulty with changes of positions or transfers between positions   [x]Reduced ability to maintain good posture and demonstrate good body mechanics with sitting, bending, and lifting   []Reduced ability to sleep   [] Reduced ability or tolerance with driving and/or computer work   [x]Reduced ability to perform lifting, carrying tasks   [x]Reduced ability to squat   []Reduced ability to forward bend   [x]Reduced ability to ambulate prolonged functional periods/distances/surfaces   [x]Reduced ability to ascend/descend stairs   [x]Reduced ability to run, hop, cut or jump   []other:    Participation Restrictions   [x]Reduced participation in self care activities   [x]Reduced participation in home management activities   [x]Reduced participation in work activities   []Reduced participation in social activities. []Reduced participation in sport/recreation activities. Classification :    []Signs/symptoms consistent with post-surgical status including decreased ROM, strength and function.    []Signs/symptoms consistent with joint sprain/strain   []Signs/symptoms consistent with patella-femoral syndrome   []Signs/symptoms consistent with knee OA/hip OA   []Signs/symptoms consistent with internal derangement of knee/Hip   []Signs/symptoms consistent with functional hip weakness/NMR control      []Signs/symptoms consistent with tendinitis/tendinosis    []signs/symptoms consistent with pathology which may benefit from Dry needling      []other:      Prognosis/Rehab Potential:      []Excellent   [x]Good    []Fair   []Poor    Tolerance of evaluation/treatment:    []Excellent   [x]Good    []Fair   []Poor    Physical Therapy Evaluation Complexity Justification  [x] A history of present problem with:  [] no personal factors and/or comorbidities that impact the plan of care;  []1-2 personal factors and/or comorbidities that impact the plan of care  [x]3 personal factors and/or comorbidities that impact the plan of care  [x] An examination of body systems using standardized tests and measures addressing any of the following: body structures and functions (impairments), activity limitations, and/or participation restrictions;:  [] a total of 1-2 or more elements   [] a total of 3 or more elements   [x] a total of 4 or more elements   [x] A clinical presentation with:  [x] stable and/or uncomplicated characteristics   [] evolving clinical presentation with changing characteristics  [] unstable and unpredictable characteristics;   [x] Clinical decision making of [x] low, [] moderate, [] high complexity using standardized patient assessment instrument and/or measurable assessment of functional outcome. [x] EVAL (LOW) 38425 (typically 20 minutes face-to-face)  [] EVAL (MOD) 64896 (typically 30 minutes face-to-face)  [] EVAL (HIGH) 32326 (typically 45 minutes face-to-face)  [] RE-EVAL     PLAN:   Frequency/Duration:  1-2 days per week for 12 Weeks:  Interventions:  [x]  Therapeutic exercise including: strength training, ROM, for Lower extremity and core   [x]  NMR activation and proprioception for LE, Glutes and Core   [x]  Manual therapy as indicated for LE, Hip and spine to include: Dry Needling/IASTM, STM, PROM, Gr I-IV mobilizations, manipulation. [x] Modalities as needed that may include: thermal agents, E-stim, Biofeedback, US, iontophoresis as indicated  [x] Patient education on joint protection, postural re-education, activity modification, progression of HEP. HEP instruction: Refer to [address] [place] [address] access code and exercises on the 1st visit treatment note    GOALS:  Patient stated goal: Walk without pain. Therapist goals for Patient:   Short Term Goals: To be achieved in: 2 weeks  1. Independent in HEP and progression per patient tolerance, in order to prevent re-injury. [] Progressing: [] Met: [] Not Met: [] Adjusted     2. Patient will have a decrease in pain to facilitate improvement in movement, function, and ADLs as indicated by Functional Deficits. [] Progressing: [] Met: [] Not Met: [] Adjusted     Long Term Goals: To be achieved in: 12 weeks  1.  Disability index score of 14% or less for the LEFS to assist with reaching prior level of function. [] Progressing: [] Met: [] Not Met: [] Adjusted     2. Patient will demonstrate increased AROM to 8 deg DF to allow for proper joint functioning as indicated by patients Functional Deficits. [] Progressing: [] Met: [] Not Met: [] Adjusted     3. Patient will demonstrate an increase in Strength to good proximal hip strength and control, within 5lb HHD in LE to allow for proper functional mobility as indicated by patients Functional Deficits. [] Progressing: [] Met: [] Not Met: [] Adjusted     4. Patient will return to functional activities including walking 20-30 mins I no AD without increased symptoms or restriction.    [] Progressing: [] Met: [] Not Met: [] Adjusted          Electronically signed by:  Mitul Lewis PT

## 2021-08-11 ENCOUNTER — HOSPITAL ENCOUNTER (OUTPATIENT)
Dept: PHYSICAL THERAPY | Age: 71
Setting detail: THERAPIES SERIES
Discharge: HOME | End: 2021-08-11
Payer: MEDICARE

## 2021-08-11 PROCEDURE — 97140 MANUAL THERAPY 1/> REGIONS: CPT | Performed by: SPECIALIST/TECHNOLOGIST

## 2021-08-11 PROCEDURE — 97110 THERAPEUTIC EXERCISES: CPT | Performed by: SPECIALIST/TECHNOLOGIST

## 2021-08-11 PROCEDURE — 97016 VASOPNEUMATIC DEVICE THERAPY: CPT | Performed by: SPECIALIST/TECHNOLOGIST

## 2021-08-11 NOTE — FLOWSHEET NOTE
[address] [place] [address] and Sports Rehabilitation[address], [address] [place] [address] [address]  Phone: [phone]   Fax:     [phone]      Physical Therapy Treatment Note/ Progress Report:     Date:  2021    Patient Name:  Hollie Goins    :  1950  MRN: [id number]  Restrictions/Precautions:    Medical/Treatment Diagnosis Information:  · Diagnosis: Left ankle pain M25. 572  · Treatment Diagnosis: Left ankle pain M25. 508  Insurance/Certification information:  PT Insurance Information: Tenzin Mascorro [id number]  Physician Information:  Referring Practitioner: Mariluz Simon  Has the plan of care been signed (Y/N):        []  Yes  [x]  No     Date of Patient follow up with Physician: EKLSEY    Is this a Progress Report:     []  Yes  [x]  No      If Yes:  Date Range for reporting period:  Beginnin21 ------------ Endin21    Progress report will be due (10 Rx or 30 days whichever is less): 43       Recertification will be due (POC Duration  / 90 days whichever is less): 21       Visit # Insurance Allowable Auth Required   In Person 2 Check auth []  Yes     []  No    Tele Health 0  []  Yes     []  No    Total 2       Functional Scale: LEFS 29%   Date assessed: 21     Latex Allergy:  [x]NO      []YES  Preferred Language for Healthcare:   [x]English       []other:    Pain level:  5/10     SUBJECTIVE:  Pt notes the more she walks on it the more sore it gets.      OBJECTIVE:    Observation:    Test measurements:      RESTRICTIONS/PRECAUTIONS: hs L TKA     Exercises/Interventions:   Therapeutic Ex (96718) Sets/sec Reps Notes/CUES HEP   Calf stretch belt (KS/KB) 30s 3ea vc    TB PF ecc  10 vc    TB DF/Inv/Ev/PF  X 30 ea blue    HR ecc lower  10 vc    Standing calf stretch 20s 3ea  vc    Hamstring stretch 30\" x3                                        Pt education 10'  HEP with gradual progression, goals of PT, not to push through pain with ex, use of ice- pt stated understanding    Manual Intervention ([address] [facility])       STM achilles/calf mm 8 min                                         NMR re-education (77007)   CUES NEEDED                                                                   Therapeutic Activity (28707)                                          [employer] access code:  [email or web address]           Therapeutic Exercise and NMR EXR  [x] (50469) Provided verbal/tactile cueing for activities related to strengthening, flexibility, endurance, ROM for improvements in LE, proximal hip, and core control with self care, mobility, lifting, ambulation. [x] (63660) Provided verbal/tactile cueing for activities related to improving balance, coordination, kinesthetic sense, posture, motor skill, proprioception to assist with LE, proximal hip, and core control in self-care, mobility, lifting, ambulation and eccentric single leg control.      NMR and Therapeutic Activities:    [x] (95919 or 37356) Provided verbal/tactile cueing for activities related to improving balance, coordination, kinesthetic sense, posture, motor skill, proprioception and motor activation to allow for proper function of core, proximal hip and LE with self-care and ADLs and functional mobility.   [] (59107) Gait Re-education- Provided training and instruction to the patient for proper LE, core and proximal hip recruitment and positioning and eccentric body weight control with ambulation re-education including up and down stairs     Home Exercise Program:    [x] (04849) Reviewed/Progressed HEP activities related to strengthening, flexibility, endurance, ROM of core, proximal hip and LE for functional self-care, mobility, lifting and ambulation/stair navigation   [] (09227) Reviewed/Progressed HEP activities related to improving balance, coordination, kinesthetic sense, posture, motor skill, proprioception of core, proximal hip and LE for self-care, mobility, lifting, and ambulation/stair navigation      Manual [] Not Met: [] Adjusted     3. Patient will demonstrate an increase in Strength to good proximal hip strength and control, within 5lb HHD in LE to allow for proper functional mobility as indicated by patients Functional Deficits. [] Progressing: [] Met: [] Not Met: [] Adjusted     4. Patient will return to functional activities including walking 20-30 mins I no AD without increased symptoms or restriction. [] Progressing: [] Met: [] Not Met: [] Adjusted         Overall Progression Towards Functional goals/ Treatment Progress Update:  [] Patient is progressing as expected towards functional goals listed. [] Progression is slowed due to complexities/Impairments listed. [] Progression has been slowed due to co-morbidities.   [x] Plan just implemented, too soon to assess goals progression <30days   [] Goals require adjustment due to lack of progress  [] Patient is not progressing as expected and requires additional follow up with physician  [] Other    Prognosis for POC: [x] Good [] Fair  [] Poor      Patient requires continued skilled intervention: [x] Yes  [] No    Treatment/Activity Tolerance:  [x] Patient able to complete treatment  [] Patient limited by fatigue  [] Patient limited by pain    [] Patient limited by other medical complications  [] Other:     Return to Play: (if applicable)   []  Stage 1: Intro to Strength   []  Stage 2: Return to Run and Strength   []  Stage 3: Return to Jump and Strength   []  Stage 4: Dynamic Strength and Agility   []  Stage 5: Sport Specific Training     []  Ready to Return to Play, Meets All Above Stages   []  Not Ready for Return to Sports   Comments:                          PLAN: See eval  [] Continue per plan of care [] Alter current plan (see comments above)  [x] Plan of care initiated [] Hold pending MD visit [] Discharge    Electronically signed by:  Dylan Shine PTA, ATC    Note: If patient does not return for scheduled/ recommended follow up visits, this note will serve as a discharge from care along with most recent update on progress.

## 2021-08-13 ENCOUNTER — HOSPITAL ENCOUNTER (OUTPATIENT)
Dept: PHYSICAL THERAPY | Age: 71
Setting detail: THERAPIES SERIES
Discharge: HOME | End: 2021-08-13
Payer: MEDICARE

## 2021-08-13 NOTE — FLOWSHEET NOTE
[address] [facility], [address] [place] [address], [address] [place] [address] [address]  Phone: [phone]   Fax:     [phone]    Physical Therapy  Cancellation/No-show Note  Patient Name:  Marissa Arenas  :  1950   Date:  2021    Cancelled visits to date: 1  No-shows to date: 0    For today's appointment patient:  [x]  Cancelled  []  Rescheduled appointment  []  No-show     Reason given by patient:  []  Patient ill  []  Conflicting appointment  []  No transportation    []  Conflict with work  []  No reason given  [x]  Other:     Comments:  Car overheated. Phone call information:   []  Phone call made today to patient at _ time at number provided:      []  Patient answered, conversation as follows:    []  Patient did not answer, message left as follows:  []  Phone call not made today  [x]  Phone call not needed - pt contacted us to cancel and provided reason for cancellation.      Electronically signed by:  Aiden Roberts PT, PT

## 2021-08-17 ENCOUNTER — HOSPITAL ENCOUNTER (OUTPATIENT)
Dept: PHYSICAL THERAPY | Age: 71
Setting detail: THERAPIES SERIES
Discharge: HOME | End: 2021-08-17
Payer: MEDICARE

## 2021-08-17 PROCEDURE — 97110 THERAPEUTIC EXERCISES: CPT

## 2021-08-17 PROCEDURE — 97016 VASOPNEUMATIC DEVICE THERAPY: CPT

## 2021-08-17 PROCEDURE — 97140 MANUAL THERAPY 1/> REGIONS: CPT

## 2021-08-17 NOTE — FLOWSHEET NOTE
pain with ex, use of ice- pt stated understanding    Manual Intervention ([phone])       STM achilles/calf mm 10 min                                         NMR re-education (21502)   CUES NEEDED                                                                   Therapeutic Activity (11558)                                          [employer] access code:  [email or web address]           Therapeutic Exercise and NMR EXR  [x] (07733) Provided verbal/tactile cueing for activities related to strengthening, flexibility, endurance, ROM for improvements in LE, proximal hip, and core control with self care, mobility, lifting, ambulation. [x] (96394) Provided verbal/tactile cueing for activities related to improving balance, coordination, kinesthetic sense, posture, motor skill, proprioception to assist with LE, proximal hip, and core control in self-care, mobility, lifting, ambulation and eccentric single leg control.      NMR and Therapeutic Activities:    [x] (85429 or 35068) Provided verbal/tactile cueing for activities related to improving balance, coordination, kinesthetic sense, posture, motor skill, proprioception and motor activation to allow for proper function of core, proximal hip and LE with self-care and ADLs and functional mobility.   [] (94391) Gait Re-education- Provided training and instruction to the patient for proper LE, core and proximal hip recruitment and positioning and eccentric body weight control with ambulation re-education including up and down stairs     Home Exercise Program:    [x] (52650) Reviewed/Progressed HEP activities related to strengthening, flexibility, endurance, ROM of core, proximal hip and LE for functional self-care, mobility, lifting and ambulation/stair navigation   [] (20481) Reviewed/Progressed HEP activities related to improving balance, coordination, kinesthetic sense, posture, motor skill, proprioception of core, proximal hip and LE for self-care, mobility, lifting, and ambulation/stair navigation      Manual Treatments:  PROM / STM / Oscillations-Mobs:  G-I, II, III, IV (PA's, Inf., Post.)  [] (32897) Provided manual therapy to mobilize LE, proximal hip and/or LS spine soft tissue/joints for the purpose of modulating pain, promoting relaxation, increasing ROM, reducing/eliminating soft tissue swelling/inflammation/restriction, improving soft tissue extensibility and allowing for proper ROM for normal function with self-care, mobility, lifting and ambulation. Modalities:     [x] GAME READY (VASO)- for significant edema, swelling, pain control. Charges:  Timed Code Treatment Minutes: 38   Total Treatment Minutes:  48   BWC:  TE TIME:  NMR TIME:  MANUAL TIME:  UNTIMED MINUTES:  Medicare Total:           [] EVAL (LOW) 24695 (typically 20 minutes face-to-face)  [] EVAL (MOD) 87295 (typically 30 minutes face-to-face)  [] EVAL (HIGH) 21601 (typically 45 minutes face-to-face)  [] RE-EVAL     [x] WL(96480) x    2 [] IONTO  [] NMR (23333) x     [x] VASO  [x] Manual (29062) x    1 [] Other:  [] TA x      [] Mech Traction (76630)  [] ES(attended) (64300)      [] ES (un) (10901):    ASSESSMENT:   Pt gradually progressing with decreased pain. No complaints throughout session. Req cues for ex. GOALS:    Patient stated goal: Walk without pain. Therapist goals for Patient:   Short Term Goals: To be achieved in: 2 weeks  1. Independent in HEP and progression per patient tolerance, in order to prevent re-injury. [] Progressing: [] Met: [] Not Met: [] Adjusted     2. Patient will have a decrease in pain to facilitate improvement in movement, function, and ADLs as indicated by Functional Deficits. [] Progressing: [] Met: [] Not Met: [] Adjusted     Long Term Goals: To be achieved in: 12 weeks  1. Disability index score of 14% or less for the LEFS to assist with reaching prior level of function. [] Progressing: [] Met: [] Not Met: [] Adjusted     2.  Patient will demonstrate increased AROM to 8 deg DF to allow for proper joint functioning as indicated by patients Functional Deficits. [] Progressing: [] Met: [] Not Met: [] Adjusted     3. Patient will demonstrate an increase in Strength to good proximal hip strength and control, within 5lb HHD in LE to allow for proper functional mobility as indicated by patients Functional Deficits. [] Progressing: [] Met: [] Not Met: [] Adjusted     4. Patient will return to functional activities including walking 20-30 mins I no AD without increased symptoms or restriction. [] Progressing: [] Met: [] Not Met: [] Adjusted         Overall Progression Towards Functional goals/ Treatment Progress Update:  [] Patient is progressing as expected towards functional goals listed. [] Progression is slowed due to complexities/Impairments listed. [] Progression has been slowed due to co-morbidities.   [x] Plan just implemented, too soon to assess goals progression <30days   [] Goals require adjustment due to lack of progress  [] Patient is not progressing as expected and requires additional follow up with physician  [] Other    Prognosis for POC: [x] Good [] Fair  [] Poor      Patient requires continued skilled intervention: [x] Yes  [] No    Treatment/Activity Tolerance:  [x] Patient able to complete treatment  [] Patient limited by fatigue  [] Patient limited by pain    [] Patient limited by other medical complications  [] Other:     Return to Play: (if applicable)   []  Stage 1: Intro to Strength   []  Stage 2: Return to Run and Strength   []  Stage 3: Return to Jump and Strength   []  Stage 4: Dynamic Strength and Agility   []  Stage 5: Sport Specific Training     []  Ready to Return to Play, Meets All Above Stages   []  Not Ready for Return to Sports   Comments:                          PLAN: See eval  [x] Continue per plan of care [] Alter current plan (see comments above)  [] Plan of care initiated [] Hold pending MD visit [] Discharge    Electronically signed by: Tyler Ochoa, PT, DPT    Note: If patient does not return for scheduled/ recommended follow up visits, this note will serve as a discharge from care along with most recent update on progress.

## 2021-08-19 ENCOUNTER — APPOINTMENT (OUTPATIENT)
Dept: PHYSICAL THERAPY | Age: 71
End: 2021-08-19
Payer: MEDICARE

## 2021-08-25 ENCOUNTER — HOSPITAL ENCOUNTER (OUTPATIENT)
Dept: PHYSICAL THERAPY | Age: 71
Setting detail: THERAPIES SERIES
Discharge: HOME | End: 2021-08-25
Payer: MEDICARE

## 2021-08-25 PROCEDURE — 97140 MANUAL THERAPY 1/> REGIONS: CPT | Performed by: SPECIALIST/TECHNOLOGIST

## 2021-08-25 PROCEDURE — 97110 THERAPEUTIC EXERCISES: CPT | Performed by: SPECIALIST/TECHNOLOGIST

## 2021-08-25 PROCEDURE — 97016 VASOPNEUMATIC DEVICE THERAPY: CPT | Performed by: SPECIALIST/TECHNOLOGIST

## 2021-08-25 NOTE — FLOWSHEET NOTE
[facility], [address] [place] [address] [address]  Phone: [phone]   Fax:     [phone]      Physical Therapy Treatment Note/ Progress Report:     Date:  2021    Patient Name:  Fazal Recinos    :  1950  MRN: [id number]  Restrictions/Precautions:    Medical/Treatment Diagnosis Information:  · Diagnosis: Left ankle pain M25. 572  · Treatment Diagnosis: Left ankle pain M25. 373  Insurance/Certification information:  PT Insurance Information: Tenzin Mascorro 142  Physician Information:  Referring Practitioner: Jazmin Mccrary  Has the plan of care been signed (Y/N):        []  Yes  [x]  No     Date of Patient follow up with Physician: ROSEANND    Is this a Progress Report:     []  Yes  [x]  No      If Yes:  Date Range for reporting period:  Beginnin21 ------------ Endin21    Progress report will be due (10 Rx or 30 days whichever is less): 40       Recertification will be due (POC Duration  / 90 days whichever is less): 21       Visit # Insurance Allowable Auth Required   In Person 4 Med Nec []  Yes     []  No    Tele Health 0  []  Yes     []  No    Total 4       Functional Scale: LEFS 29%   Date assessed: 21     Latex Allergy:  [x]NO      []YES  Preferred Language for Healthcare:   [x]English       []other:    Pain level:  5/10     SUBJECTIVE:  Pt notes her foot is feeling a little better.       OBJECTIVE:    Observation:    Test measurements:      RESTRICTIONS/PRECAUTIONS: hs L TKA     Exercises/Interventions:   Therapeutic Ex (95729) Sets/sec Reps Notes/CUES HEP   Bike  Calf stretch belt (KS/KB) 6 min  30s   3ea   vc    TB PF ecc  30 vc    TB DF/Inv/Ev/PF  X 30 ea blue    HR ecc lower 2 10 vc    Standing calf stretch 20s 3ea  vc    Hamstring stretch 30\" x3     SLR 2 10 vc    Bridge 2 10 vc    SLS 5s 10 vc                  Pt education 10'  HEP with gradual progression, goals of PT, not to push ambulation/stair navigation      Manual Treatments:  PROM / STM / Oscillations-Mobs:  G-I, II, III, IV (PA's, Inf., Post.)  [] (44498) Provided manual therapy to mobilize LE, proximal hip and/or LS spine soft tissue/joints for the purpose of modulating pain, promoting relaxation, increasing ROM, reducing/eliminating soft tissue swelling/inflammation/restriction, improving soft tissue extensibility and allowing for proper ROM for normal function with self-care, mobility, lifting and ambulation. Modalities:     [x] GAME READY (VASO)- for significant edema, swelling, pain control. Charges:  Timed Code Treatment Minutes: 38   Total Treatment Minutes:  48   BWC:  TE TIME:  NMR TIME:  MANUAL TIME:  UNTIMED MINUTES:  Medicare Total:           [] EVAL (LOW) 73493 (typically 20 minutes face-to-face)  [] EVAL (MOD) 09388 (typically 30 minutes face-to-face)  [] EVAL (HIGH) 93861 (typically 45 minutes face-to-face)  [] RE-EVAL     [x] NH(72289) x    2 [] IONTO  [] NMR (10561) x     [x] VASO  [x] Manual (94603) x    1 [] Other:  [] TA x      [] Mech Traction (79850)  [] ES(attended) (52760)      [] ES (un) (04026):    ASSESSMENT:   Pt gradually progressing with decreased pain. No complaints throughout session. Req cues for ex. GOALS:    Patient stated goal: Walk without pain. Therapist goals for Patient:   Short Term Goals: To be achieved in: 2 weeks  1. Independent in HEP and progression per patient tolerance, in order to prevent re-injury. [] Progressing: [] Met: [] Not Met: [] Adjusted     2. Patient will have a decrease in pain to facilitate improvement in movement, function, and ADLs as indicated by Functional Deficits. [] Progressing: [] Met: [] Not Met: [] Adjusted     Long Term Goals: To be achieved in: 12 weeks  1. Disability index score of 14% or less for the LEFS to assist with reaching prior level of function. [] Progressing: [] Met: [] Not Met: [] Adjusted     2.  Patient will demonstrate increased AROM to 8 deg DF to allow for proper joint functioning as indicated by patients Functional Deficits. [] Progressing: [] Met: [] Not Met: [] Adjusted     3. Patient will demonstrate an increase in Strength to good proximal hip strength and control, within 5lb HHD in LE to allow for proper functional mobility as indicated by patients Functional Deficits. [] Progressing: [] Met: [] Not Met: [] Adjusted     4. Patient will return to functional activities including walking 20-30 mins I no AD without increased symptoms or restriction. [] Progressing: [] Met: [] Not Met: [] Adjusted         Overall Progression Towards Functional goals/ Treatment Progress Update:  [] Patient is progressing as expected towards functional goals listed. [] Progression is slowed due to complexities/Impairments listed. [] Progression has been slowed due to co-morbidities.   [x] Plan just implemented, too soon to assess goals progression <30days   [] Goals require adjustment due to lack of progress  [] Patient is not progressing as expected and requires additional follow up with physician  [] Other    Prognosis for POC: [x] Good [] Fair  [] Poor      Patient requires continued skilled intervention: [x] Yes  [] No    Treatment/Activity Tolerance:  [x] Patient able to complete treatment  [] Patient limited by fatigue  [] Patient limited by pain    [] Patient limited by other medical complications  [] Other:     Return to Play: (if applicable)   []  Stage 1: Intro to Strength   []  Stage 2: Return to Run and Strength   []  Stage 3: Return to Jump and Strength   []  Stage 4: Dynamic Strength and Agility   []  Stage 5: Sport Specific Training     []  Ready to Return to Play, Meets All Above Stages   []  Not Ready for Return to Sports   Comments:                          PLAN: See eval  [x] Continue per plan of care [] Alter current plan (see comments above)  [] Plan of care initiated [] Hold pending MD visit [] Discharge    Electronically signed by:  Fabián Jacobsen PTA, FLORYT    Note: If patient does not return for scheduled/ recommended follow up visits, this note will serve as a discharge from care along with most recent update on progress.

## 2021-09-02 ENCOUNTER — HOSPITAL ENCOUNTER (OUTPATIENT)
Dept: PHYSICAL THERAPY | Age: 71
Setting detail: THERAPIES SERIES
Discharge: HOME | End: 2021-09-02
Payer: MEDICARE

## 2021-09-02 PROCEDURE — 97110 THERAPEUTIC EXERCISES: CPT | Performed by: SPECIALIST/TECHNOLOGIST

## 2021-09-02 PROCEDURE — 97140 MANUAL THERAPY 1/> REGIONS: CPT | Performed by: SPECIALIST/TECHNOLOGIST

## 2021-09-02 PROCEDURE — 97016 VASOPNEUMATIC DEVICE THERAPY: CPT | Performed by: SPECIALIST/TECHNOLOGIST

## 2021-09-02 NOTE — FLOWSHEET NOTE
[address] [place] [address] and Sports Rehabilitation[address], [address] [place] [address] [address]  Phone: [phone]   Fax:     [phone]      Physical Therapy Treatment Note/ Progress Report:     Date:  2021    Patient Name:  Prince Ku    :  1950  MRN: [id number]  Restrictions/Precautions:    Medical/Treatment Diagnosis Information:  · Diagnosis: Left ankle pain M25. 572  · Treatment Diagnosis: Left ankle pain M25. 931  Insurance/Certification information:  PT Insurance Information: Tenzin Mascorro [id number]  Physician Information:  Referring Practitioner: Matthew Barrera  Has the plan of care been signed (Y/N):        []  Yes  [x]  No     Date of Patient follow up with Physician: KELSEY    Is this a Progress Report:     []  Yes  [x]  No      If Yes:  Date Range for reporting period:  Beginnin21 ------------ Endin21    Progress report will be due (10 Rx or 30 days whichever is less): 94       Recertification will be due (POC Duration  / 90 days whichever is less): 21       Visit # Insurance Allowable Auth Required   In Person 5 Med Nec []  Yes     []  No    Tele Health 0  []  Yes     []  No    Total 5       Functional Scale: LEFS 29%   Date assessed: 21     Latex Allergy:  [x]NO      []YES  Preferred Language for Healthcare:   [x]English       []other:    Pain level:  5/10     SUBJECTIVE:  Pt notes her foot is feeling a lot better. Notes she will leave for [place] on Monday.       OBJECTIVE:    Observation:    Test measurements:      RESTRICTIONS/PRECAUTIONS: hs L TKA     Exercises/Interventions:   Therapeutic Ex (58488) Sets/sec Reps Notes/CUES HEP   Bike  Calf stretch belt (KS/KB) 6 min  30s   3ea   vc    TB PF ecc  30 vc    TB DF/Inv/Ev/PF  X 30 ea blue    HR ecc lower 2 10 vc    Standing calf stretch 20s 3ea  vc    Hamstring stretch 30\" x3     SLR 2 10 vc    Bridge 2 10 vc    SLS 5s 10 vc                  Pt education 10'  HEP with gradual progression, goals of PT, not to push through pain with ex, use of ice- pt stated understanding    Manual Intervention (94369)       STM achilles/calf mm 10 min                                         NMR re-education (81065)   CUES NEEDED                                                                   Therapeutic Activity (07668)                                          Savored access code:  [email or web address]           Therapeutic Exercise and NMR EXR  [x] (19267) Provided verbal/tactile cueing for activities related to strengthening, flexibility, endurance, ROM for improvements in LE, proximal hip, and core control with self care, mobility, lifting, ambulation. [x] (72451) Provided verbal/tactile cueing for activities related to improving balance, coordination, kinesthetic sense, posture, motor skill, proprioception to assist with LE, proximal hip, and core control in self-care, mobility, lifting, ambulation and eccentric single leg control.      NMR and Therapeutic Activities:    [x] (92789 or 45809) Provided verbal/tactile cueing for activities related to improving balance, coordination, kinesthetic sense, posture, motor skill, proprioception and motor activation to allow for proper function of core, proximal hip and LE with self-care and ADLs and functional mobility.   [] (97914) Gait Re-education- Provided training and instruction to the patient for proper LE, core and proximal hip recruitment and positioning and eccentric body weight control with ambulation re-education including up and down stairs     Home Exercise Program:    [x] (48021) Reviewed/Progressed HEP activities related to strengthening, flexibility, endurance, ROM of core, proximal hip and LE for functional self-care, mobility, lifting and ambulation/stair navigation   [] (88680) Reviewed/Progressed HEP activities related to improving balance, coordination, kinesthetic sense, posture, motor skill, proprioception of core, proximal hip and LE for Patient will demonstrate increased AROM to 8 deg DF to allow for proper joint functioning as indicated by patients Functional Deficits. [] Progressing: [] Met: [] Not Met: [] Adjusted     3. Patient will demonstrate an increase in Strength to good proximal hip strength and control, within 5lb HHD in LE to allow for proper functional mobility as indicated by patients Functional Deficits. [] Progressing: [] Met: [] Not Met: [] Adjusted     4. Patient will return to functional activities including walking 20-30 mins I no AD without increased symptoms or restriction. [] Progressing: [] Met: [] Not Met: [] Adjusted         Overall Progression Towards Functional goals/ Treatment Progress Update:  [] Patient is progressing as expected towards functional goals listed. [] Progression is slowed due to complexities/Impairments listed. [] Progression has been slowed due to co-morbidities.   [x] Plan just implemented, too soon to assess goals progression <30days   [] Goals require adjustment due to lack of progress  [] Patient is not progressing as expected and requires additional follow up with physician  [] Other    Prognosis for POC: [x] Good [] Fair  [] Poor      Patient requires continued skilled intervention: [x] Yes  [] No    Treatment/Activity Tolerance:  [x] Patient able to complete treatment  [] Patient limited by fatigue  [] Patient limited by pain    [] Patient limited by other medical complications  [] Other:     Return to Play: (if applicable)   []  Stage 1: Intro to Strength   []  Stage 2: Return to Run and Strength   []  Stage 3: Return to Jump and Strength   []  Stage 4: Dynamic Strength and Agility   []  Stage 5: Sport Specific Training     []  Ready to Return to Play, Meets All Above Stages   []  Not Ready for Return to Sports   Comments:                          PLAN: See eval  [x] Continue per plan of care [] Alter current plan (see comments above)  [] Plan of care initiated [] Hold pending MD visit [] Discharge    Electronically signed by:  Romel Garza PTA, DPT    Note: If patient does not return for scheduled/ recommended follow up visits, this note will serve as a discharge from care along with most recent update on progress.

## 2022-01-20 ENCOUNTER — TELEPHONE (OUTPATIENT)
Dept: PULMONOLOGY | Age: 72
End: 2022-01-20

## 2022-09-20 ENCOUNTER — OFFICE VISIT (OUTPATIENT)
Dept: PULMONOLOGY | Age: 72
End: 2022-09-20
Payer: MEDICARE

## 2022-09-20 VITALS
HEIGHT: 59 IN | RESPIRATION RATE: 16 BRPM | BODY MASS INDEX: 33.06 KG/M2 | OXYGEN SATURATION: 100 % | SYSTOLIC BLOOD PRESSURE: 183 MMHG | DIASTOLIC BLOOD PRESSURE: 95 MMHG | HEART RATE: 95 BPM | TEMPERATURE: 97 F | WEIGHT: 164 LBS

## 2022-09-20 DIAGNOSIS — J45.40 MODERATE PERSISTENT ASTHMA WITHOUT COMPLICATION: ICD-10-CM

## 2022-09-20 DIAGNOSIS — I51.89 DIASTOLIC DYSFUNCTION: ICD-10-CM

## 2022-09-20 DIAGNOSIS — R03.0 ELEVATED BLOOD PRESSURE READING: ICD-10-CM

## 2022-09-20 DIAGNOSIS — E66.9 CLASS 1 OBESITY WITH BODY MASS INDEX (BMI) OF 33.0 TO 33.9 IN ADULT, UNSPECIFIED OBESITY TYPE, UNSPECIFIED WHETHER SERIOUS COMORBIDITY PRESENT: ICD-10-CM

## 2022-09-20 DIAGNOSIS — G47.33 OSA (OBSTRUCTIVE SLEEP APNEA): ICD-10-CM

## 2022-09-20 PROCEDURE — 3017F COLORECTAL CA SCREEN DOC REV: CPT | Performed by: INTERNAL MEDICINE

## 2022-09-20 PROCEDURE — 1036F TOBACCO NON-USER: CPT | Performed by: INTERNAL MEDICINE

## 2022-09-20 PROCEDURE — 1090F PRES/ABSN URINE INCON ASSESS: CPT | Performed by: INTERNAL MEDICINE

## 2022-09-20 PROCEDURE — 1123F ACP DISCUSS/DSCN MKR DOCD: CPT | Performed by: INTERNAL MEDICINE

## 2022-09-20 PROCEDURE — G8417 CALC BMI ABV UP PARAM F/U: HCPCS | Performed by: INTERNAL MEDICINE

## 2022-09-20 PROCEDURE — G8400 PT W/DXA NO RESULTS DOC: HCPCS | Performed by: INTERNAL MEDICINE

## 2022-09-20 PROCEDURE — 99203 OFFICE O/P NEW LOW 30 MIN: CPT | Performed by: INTERNAL MEDICINE

## 2022-09-20 PROCEDURE — G8427 DOCREV CUR MEDS BY ELIG CLIN: HCPCS | Performed by: INTERNAL MEDICINE

## 2022-09-20 RX ORDER — FLUTICASONE PROPIONATE AND SALMETEROL 100; 50 UG/1; UG/1
1 POWDER RESPIRATORY (INHALATION) EVERY 12 HOURS
Qty: 3 EACH | Refills: 3 | Status: SHIPPED | OUTPATIENT
Start: 2022-09-20

## 2022-09-20 RX ORDER — ALBUTEROL SULFATE 2.5 MG/3ML
2.5 SOLUTION RESPIRATORY (INHALATION) EVERY 6 HOURS PRN
Qty: 120 EACH | Refills: 5 | Status: SHIPPED | OUTPATIENT
Start: 2022-09-20

## 2022-09-20 RX ORDER — BUPROPION HYDROCHLORIDE 100 MG/1
TABLET, EXTENDED RELEASE ORAL
COMMUNITY
Start: 2022-07-21

## 2022-09-20 RX ORDER — ALBUTEROL SULFATE 90 UG/1
AEROSOL, METERED RESPIRATORY (INHALATION)
COMMUNITY

## 2022-09-20 ASSESSMENT — SLEEP AND FATIGUE QUESTIONNAIRES
NECK CIRCUMFERENCE (INCHES): 13
HOW LIKELY ARE YOU TO NOD OFF OR FALL ASLEEP WHILE SITTING AND READING: 1
HOW LIKELY ARE YOU TO NOD OFF OR FALL ASLEEP WHILE LYING DOWN TO REST IN THE AFTERNOON WHEN CIRCUMSTANCES PERMIT: 0
HOW LIKELY ARE YOU TO NOD OFF OR FALL ASLEEP WHILE SITTING AND TALKING TO SOMEONE: 0
HOW LIKELY ARE YOU TO NOD OFF OR FALL ASLEEP WHILE SITTING INACTIVE IN A PUBLIC PLACE: 0
HOW LIKELY ARE YOU TO NOD OFF OR FALL ASLEEP IN A CAR, WHILE STOPPED FOR A FEW MINUTES IN TRAFFIC: 0
HOW LIKELY ARE YOU TO NOD OFF OR FALL ASLEEP WHILE WATCHING TV: 0
HOW LIKELY ARE YOU TO NOD OFF OR FALL ASLEEP WHEN YOU ARE A PASSENGER IN A CAR FOR AN HOUR WITHOUT A BREAK: 0
HOW LIKELY ARE YOU TO NOD OFF OR FALL ASLEEP WHILE SITTING QUIETLY AFTER LUNCH WITHOUT ALCOHOL: 0
ESS TOTAL SCORE: 1

## 2022-09-20 NOTE — PROGRESS NOTES
MA Communication:   The following orders are received by verbal communication from Gregory Felipe MD    Orders include:  6 month f/u

## 2022-09-20 NOTE — PATIENT INSTRUCTIONS
Remember to bring a list of pulmonary medications and any CPAP or BiPAP machines to your next appointment with the office. Please keep all of your future appointments scheduled by [address], [facility] Pulmonary office. Out of respect for other patients and providers, you may be asked to reschedule your appointment if you arrive later than your scheduled appointment time. Appointments cancelled less than 24hrs in advance will be considered a no show. Patients with three missed appointments within 1 year or four missed appointments within 2 years can be dismissed from the practice. Please be aware that our physicians are required to work in the Intensive Care Unit at [facility].  Your appointment may need to be rescheduled if they are designated to work during your appointment time. You may receive a survey regarding the care you received during your visit. Your input is valuable to us. We encourage you to complete and return your survey. We hope you will choose us in the future for your healthcare needs. Pt instructed of all future appointment dates & times, including radiology, labs, procedures & referrals. If procedures were scheduled preparation instructions provided. Instructions on future appointments with [facility] Pulmonary were given.

## 2022-09-20 NOTE — PROGRESS NOTES
Chief Complaint/Referring Provider:  Patient is being seen at the request of Dr. Eb Jeronimo for a consultation for asthma      Presenting HPI: Patient is a 79-year-old female who has come to the office for a pulm evaluation for asthma  Patient states that she is moving from [place] to [place] and patient wants to establish to the practice for lung issues, patient states that she has history of asthma and patient has been on medications for the same, on questioning patient states that she does have shortness of breath more so with a change of weather and when the weather is hot and humid patient has more shortness of breath, along with the shortness of breath patient has cough with yellowish secretions, patient does not have any significant nasal congestion sinus congestion postnasal drainage, no otalgia no ear discharge, no difficulty in swallowing, no coughing or choking while eating, patient states that sometimes she feels that she cannot get enough air, patient has been using Advair twice daily along with that patient states that she has a rescue inhaler and also patient states that when she has too much of symptoms going on she has albuterol nebulization which she uses, patient does not have any significant chest pain or any pleuritic chest pain or any palpitations, patient states that sometimes she feels dizzy which is not very often but on average of 2 times in the ER, patient does not have any fever or chills, patient does not have any significant reflux symptoms, patient had diarrhea and was told that she has gluten allergy which was diagnosed about 2 weeks back as per the patient, patient does not have any small joint pains, no skin rashes or any eczema, patient does not have any increasing leg swelling, patient does give history of sleep fragmentation, patient has dogs and pets as cats, patient states that she was gaining weight in the [place] pandemic but patient has lost 12 pounds in the last few weeks time, patient does not have any change in the ambient environment, patient does not have any new medications, patient does not have any other pertinent review of system of concern    Past Medical History:   Diagnosis Date    Hypertension     Thyroid disease        History reviewed. No pertinent surgical history. Allergies   Allergen Reactions    Latex     Oxycodone     Penicillins        Medication list was reviewed and updated as needed in [facility]    Social History     Socioeconomic History    Marital status: Single     Spouse name: Not on file    Number of children: Not on file    Years of education: Not on file    Highest education level: Not on file   Occupational History    Not on file   Tobacco Use    Smoking status: Never    Smokeless tobacco: Never   Substance and Sexual Activity    Alcohol use: Yes    Drug use: Never    Sexual activity: Not on file   Other Topics Concern    Not on file   Social History Narrative    Not on file     Social Determinants of Health     Financial Resource Strain: Not on file   Food Insecurity: Not on file   Transportation Needs: Not on file   Physical Activity: Not on file   Stress: Not on file   Social Connections: Not on file   Intimate Partner Violence: Not on file   Housing Stability: Not on file       History reviewed. No pertinent family history. Review of Systems same as above    Physical Exam:  Blood pressure (!) 183/95, pulse 95, temperature 97 °F (36.1 °C), temperature source Temporal, resp. rate 16, height 4' 11\" (1.499 m), weight 164 lb (74.4 kg), SpO2 100 %.'  Constitutional:  No acute distress. HENT:  Oropharynx is clear and moist. No thyromegaly. Mallampati 2-3  Eyes:  Conjunctivae arenormal. Pupils equal, round, and reactive to light. No scleral icterus. Neck: . No tracheal deviation present. No obvious thyroid mass. Cardiovascular:Normal rate, regular rhythm, normal heart sounds. No right ventricular heave.   1+ lower extremity edema.  Pulmonary/Chest: No wheezes. No rales. Chest wall is not dull to percussion. Noaccessory muscle usage or stridor. Decreased breath sound intensity  Abdominal: Soft. Bowel sounds present. No distension or hernia. Notenderness. Musculoskeletal: No cyanosis. No clubbing. No obvious joint deformity. Lymphadenopathy: No cervical or supraclavicular adenopathy. Skin: Skin is warm and dry. No rash or nodules on the exposed extremities. Psychiatric: Normal mood and affect. Behavior is normal.  No anxiety. Neurologic: Alert, awake and oriented. PERRL. Speech fluent      Sleep Medicine Data:  Sitting and reading: Slight chance of dozing  Watching TV: Would never doze  Sitting, inactive in a public place (e.g. a theatre or a meeting): Would never doze  As a passenger in a car for an hour without a break: Would never doze  Lying down to rest in the afternoon when circumstances permit: Would never doze  Sitting and talking to someone: Would never doze  Sitting quietly after a lunch without alcohol: Would never doze  In a car, while stopped for a few minutes in traffic: Would never doze  [place] Sleepiness Score: 1  Neck circumference (Inches): 13    Data:     Imaging:  I have reviewed radiology images personally. No orders to display     ECHO in [facility] in April 2022-Left ventricle: Normal left ventricular size. Normal left ventricular   systolic function. The visually estimated left ventricular ejection   fraction is 70%. Increased left ventricular wall thickness consistent   with concentric hypertrophy. No regional wall motion abnormalities are   seen. Grade I diastolic dysfunction with normal left atrial pressure. Assessment:    1. Moderate persistent asthma without complication    - fluticasone-salmeterol (ADVAIR DISKUS) 100-50 MCG/ACT AEPB diskus inhaler; Inhale 1 puff into the lungs in the morning and 1 puff in the evening. Rinse mouth with water after use. Dispense: 3 each;  Refill: 3  - albuterol (PROVENTIL) (2.5 MG/3ML) 0.083% nebulizer solution; Take 3 mLs by nebulization every 6 hours as needed for Wheezing  Dispense: 120 each; Refill: 5    2. Diastolic dysfunction      3. Elevated blood pressure reading      4. Class 1 obesity with body mass index (BMI) of 33.0 to 33.9 in adult, unspecified obesity type, unspecified whether serious comorbidity present      5.  FLAQUITA (obstructive sleep apnea)        Plan:   Patient's review of system were discussed  Patient was told about the clinical finding including auscultation and implications  Patient's echocardiogram from [facility] was reviewed with the patient and the implications discussed  No PFT available in care everywhere to review  Patient has seen Dr. Kendrick Ibrahim in neurosurgery but his office note cannot be opened because of authorization issue  Clinically patient may have moderate persistent asthma along with that patient also has class I obesity patient also appears to have FLAQUITA along with that patient has diastolic dysfunction and patient also has uncontrolled hypertension at this time which may be contributing to patient's symptomatology  Patient was told about the pathophysiology of the disease process and its modifying factors  Patient was more interested in getting refills on her inhalers and to be sent to [facility] as per the patient as it costs her much rest from there  Patient's refills on Advair and albuterol nebulizer solution printed out and given to the patient and patient was told that she can send it to the desired pharmacy on her own if it has to go out of the country  Patient was told about the pathophysiology and sequelae of FLAQUITA  Patient may benefit from a sleep study-wants to defer for now  Patient was told about salt and fluid restriction in the diet  Patient's blood pressure is not optimally controlled and patient needs to discuss with PCP about titration of antihypertensives  Patient also will benefit from diuretics if deemed appropriate by PCP  Patient needs to lose weight  Patient will benefit from a PFT down the line if patient agreeable  Patient needs to have a regular regimented exercise  Patient has exposure to many animal dander and may benefit from a HEPA filter at home  Humidifier in the bedroom in [facility] will help  Patient will benefit from a flu shot in the next few weeks time and can discuss with PCP  Further management depending on patient clinical status and follow-up on above recommendations

## 2023-04-05 ENCOUNTER — EMERGENCY (EMERGENCY)
Facility: HOSPITAL | Age: 73
LOS: 1 days | Discharge: HOME | End: 2023-04-05
Attending: EMERGENCY MEDICINE | Admitting: EMERGENCY MEDICINE
Payer: MEDICARE

## 2023-04-05 VITALS
HEIGHT: 59 IN | TEMPERATURE: 98 F | RESPIRATION RATE: 17 BRPM | WEIGHT: 160.06 LBS | HEART RATE: 101 BPM | OXYGEN SATURATION: 98 % | DIASTOLIC BLOOD PRESSURE: 70 MMHG | SYSTOLIC BLOOD PRESSURE: 139 MMHG
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
## 2023-04-05 DIAGNOSIS — E03.9 HYPOTHYROIDISM, UNSPECIFIED: ICD-10-CM

## 2023-04-05 DIAGNOSIS — Z87.09 PERSONAL HISTORY OF OTHER DISEASES OF THE RESPIRATORY SYSTEM: ICD-10-CM

## 2023-04-05 DIAGNOSIS — Z88.0 ALLERGY STATUS TO PENICILLIN: ICD-10-CM

## 2023-04-05 DIAGNOSIS — S00.83XA CONTUSION OF OTHER PART OF HEAD, INITIAL ENCOUNTER: ICD-10-CM
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
## 2023-04-05 DIAGNOSIS — S09.90XA UNSPECIFIED INJURY OF HEAD, INITIAL ENCOUNTER: ICD-10-CM

## 2023-04-05 DIAGNOSIS — R07.89 OTHER CHEST PAIN: ICD-10-CM

## 2023-04-05 DIAGNOSIS — Z91.040 LATEX ALLERGY STATUS: ICD-10-CM

## 2023-04-05 DIAGNOSIS — S29.9XXA UNSPECIFIED INJURY OF THORAX, INITIAL ENCOUNTER: ICD-10-CM
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
## 2023-04-05 PROCEDURE — 99285 EMERGENCY DEPT VISIT HI MDM: CPT

## 2023-04-05 PROCEDURE — 71046 X-RAY EXAM CHEST 2 VIEWS: CPT | Mod: 26

## 2023-04-05 PROCEDURE — 70486 CT MAXILLOFACIAL W/O DYE: CPT | Mod: 26,MH

## 2023-04-05 PROCEDURE — 70450 CT HEAD/BRAIN W/O DYE: CPT | Mod: 26,MH

## 2023-04-05 RX ORDER — ACETAMINOPHEN 500 MG
650 TABLET ORAL ONCE
Refills: 0 | Status: COMPLETED | OUTPATIENT
Start: 2023-04-05 | End: 2023-04-05

## 2023-04-05 RX ADMIN — Medication 650 MILLIGRAM(S): at 18:40

## 2023-04-05 NOTE — ED PROVIDER NOTE - PATIENT PORTAL LINK FT
You can access the FollowMyHealth Patient Portal offered by [facility] by registering at the following website: [email or web address]. By joining [employer]’s FollowMyHealth portal, you will also be able to view your health information using other applications (apps) compatible with our system.

## 2023-04-05 NOTE — ED ADULT TRIAGE NOTE - HEIGHT IN INCHES
"  5/20/2019      RE: Julissa Contreras  [address] [facility] [place] [facility] [place] [address]       SUBJECTIVE:   Julissa returns today for follow up. She was referred for Mind Body skills training to help with transition of living with chronic illness as she was dx with Lupus   Julissa self reports with mom in the room and then out of the room that she is \"fine\". She continues to do well in school and has friends that she enjoys being with. She is not very active outside of school and this has not changed. She is sleeping well. She describes her mood as \"fine\".     Mom sees that Julissa is a bit more engaged with home life. Mom wonders if Julissa is feeling better Since starting meds for lupus. Overall Julissa continues to do well in school. Mom has not seen any change in her mood or noted behaviors that she is concerned about.     Julissa was seen individually and does not have any more to add. She does not have any goals for this visit.   Interim Follow up:      Objective:  There were no vitals taken for this visit.   EXAM:  Developmental and Behavioral: affect normal/bright and mood congruent  impulse control appropriate for context  activity level appropriate for context  attention span appropriate for context  social reciprocity appropriate for developmental age  joint attention appropriate for developmental age  no preoccupations, stereotypies, or atypical behavioral mannerisms  judgment and insight intact  mentation appears normal    (F43.22) Adjustment disorder with anxious mood  (primary encounter diagnosis)       PLAN:  Reassured mom that Julissa appears to be doing well with this transition. Call as needed for follow up.        25 minutes and More than 50% of the time spent on counseling / coordinating care    Malini Peters MD, MPH  [facility]  Developmental-Behavioral Pediatrics  __________________________________________________________  ag      Malini Peters MD, MD    " 11

## 2023-04-05 NOTE — ED PROVIDER NOTE - CLINICAL SUMMARY MEDICAL DECISION MAKING FREE TEXT BOX
1. Head injury - HCT to r/o ICH.  L orbital injury with ecchymoses.  CT face to r/o fractures.  Eyeball itself appears intact and normal on PE.  No visual disturbance.  Ice pack applied.  Tylenol for pain,.   2. Chest wall injury - Clinical exam benign.  Will Xray to ensure no pulmonary injury.

## 2023-04-05 NOTE — ED PROVIDER NOTE - PHYSICAL EXAMINATION
VITAL SIGNS: I have reviewed nursing notes and confirm.  CONSTITUTIONAL: Well-developed; well-nourished; in no apparent distress.  SKIN: +Ecchymoses and edema to L upper orbit.   EYES: PERRL, EOM intact and without double vision; Snellen intact and equal b/l; conjunctiva and sclera clear; +TTP over L superior orbit.  No numbness.   ENT: No nasal discharge; airway clear; no nasal or mandibular bone TTP.  No broken teeth.   NECK: Supple; non tender midline.   CARD: S1, S2 normal; no murmurs, gallops, or rubs. Regular rate and rhythm.  RESP: No wheezes, rales or rhonchi.  MSK: Normal ROM. No clubbing, cyanosis or edema.  NEURO: Alert, oriented. Grossly unremarkable.  PSYCH: Cooperative, appropriate.

## 2023-04-05 NOTE — ED PROVIDER NOTE - OBJECTIVE STATEMENT
Pt is a 71yo F who reports a mechanical fall today at noon.  Pt was walking her dog and while exiting her lobby, her dog pulled in front of her and she tripped, falling forward.  Struck L head/face to ground.  Also struck anterior chest wall.  Minimal chest wall pain.  +Swelling and bruising to L orbit.  No LOC.  No neck pain.  No extremity injury.

## 2023-11-20 ENCOUNTER — HOSPITAL ENCOUNTER (OUTPATIENT)
Dept: PHYSICAL THERAPY | Age: 73
Setting detail: THERAPIES SERIES
Discharge: HOME | End: 2023-11-20

## 2023-11-20 NOTE — FLOWSHEET NOTE
[address] [facility] and [facility] Rehabilitation, [place] [address]  Phone: [phone]   Fax:     [phone]    Physical Therapy  Cancellation/No-show Note  Patient Name:  Muriel Pedro  :  1950   Date:  2023    Cancelled visits to date: 1  No-shows to date: 0    For today's appointment patient:  [x]  Cancelled  []  Rescheduled appointment  []  No-show     Reason given by patient:  [x]  Patient ill  []  Conflicting appointment  []  No transportation    []  Conflict with work  []  No reason given  []  Other:     Comments:      Phone call information:   []  Phone call made today to patient at am/pm at the number provided:      []  Patient answered, conversation as follows:    []  Patient did not answer, message left as follows:  [x]  Phone call not needed - pt contacted us to cancel and provided reason for cancellation.      Electronically signed by:  Ricki Shrestha, PT, PT

## 2023-11-24 ENCOUNTER — APPOINTMENT (OUTPATIENT)
Dept: CT IMAGING | Age: 73
DRG: 175 | End: 2023-11-24
Payer: MEDICARE

## 2023-11-24 ENCOUNTER — APPOINTMENT (OUTPATIENT)
Dept: GENERAL RADIOLOGY | Age: 73
DRG: 175 | End: 2023-11-24
Payer: MEDICARE

## 2023-11-24 ENCOUNTER — HOSPITAL ENCOUNTER (INPATIENT)
Age: 73
LOS: 3 days | Discharge: NURSING, REHAB OR LONG-TERM CARE | DRG: 175 | End: 2023-11-28
Attending: STUDENT IN AN ORGANIZED HEALTH CARE EDUCATION/TRAINING PROGRAM | Admitting: INTERNAL MEDICINE
Payer: MEDICARE

## 2023-11-24 DIAGNOSIS — R06.09 DYSPNEA ON EXERTION: ICD-10-CM

## 2023-11-24 DIAGNOSIS — E87.1 HYPONATREMIA: Primary | ICD-10-CM

## 2023-11-24 DIAGNOSIS — R79.89 ELEVATED BRAIN NATRIURETIC PEPTIDE (BNP) LEVEL: ICD-10-CM

## 2023-11-24 DIAGNOSIS — M25.559 HIP PAIN, UNSPECIFIED LATERALITY: ICD-10-CM

## 2023-11-24 DIAGNOSIS — T79.1XXA: ICD-10-CM

## 2023-11-24 DIAGNOSIS — R31.9 URINARY TRACT INFECTION WITH HEMATURIA, SITE UNSPECIFIED: ICD-10-CM

## 2023-11-24 DIAGNOSIS — R79.89 ELEVATED TROPONIN: ICD-10-CM

## 2023-11-24 DIAGNOSIS — N39.0 URINARY TRACT INFECTION WITH HEMATURIA, SITE UNSPECIFIED: ICD-10-CM

## 2023-11-24 LAB
ALBUMIN SERPL-MCNC: 3.3 G/DL (ref 3.4–5)
ALBUMIN/GLOB SERPL: 1.1 {RATIO} (ref 1.1–2.2)
ALP SERPL-CCNC: 84 U/L (ref 40–129)
ALT SERPL-CCNC: 46 U/L (ref 10–40)
AMORPH SED URNS QL MICRO: ABNORMAL /HPF
ANION GAP SERPL CALCULATED.3IONS-SCNC: 15 MMOL/L (ref 3–16)
AST SERPL-CCNC: 62 U/L (ref 15–37)
BACTERIA URNS QL MICRO: ABNORMAL /HPF
BASE EXCESS BLDV CALC-SCNC: -5.5 MMOL/L (ref -3–3)
BASOPHILS # BLD: 0.1 K/UL (ref 0–0.2)
BASOPHILS NFR BLD: 1 %
BILIRUB SERPL-MCNC: 0.9 MG/DL (ref 0–1)
BILIRUB UR QL STRIP.AUTO: ABNORMAL
BUN SERPL-MCNC: 21 MG/DL (ref 7–20)
CALCIUM SERPL-MCNC: 8.7 MG/DL (ref 8.3–10.6)
CHLORIDE SERPL-SCNC: 98 MMOL/L (ref 99–110)
CLARITY UR: CLEAR
CO2 BLDV-SCNC: 20 MMOL/L
CO2 SERPL-SCNC: 17 MMOL/L (ref 21–32)
COARSE GRAN CASTS #/AREA URNS LPF: ABNORMAL /LPF (ref 0–2)
COHGB MFR BLDV: 2.4 % (ref 0–1.5)
COLOR UR: YELLOW
CREAT SERPL-MCNC: 0.9 MG/DL (ref 0.6–1.2)
CRYSTALS URNS MICRO: ABNORMAL /HPF
D DIMER: 2.81 UG/ML FEU (ref 0–0.6)
DEPRECATED RDW RBC AUTO: 15.6 % (ref 12.4–15.4)
EOSINOPHIL # BLD: 0 K/UL (ref 0–0.6)
EOSINOPHIL NFR BLD: 0 %
EPI CELLS #/AREA URNS HPF: ABNORMAL /HPF (ref 0–5)
GFR SERPLBLD CREATININE-BSD FMLA CKD-EPI: >60 ML/MIN/{1.73_M2}
GLUCOSE SERPL-MCNC: 94 MG/DL (ref 70–99)
GLUCOSE UR STRIP.AUTO-MCNC: NEGATIVE MG/DL
HCO3 BLDV-SCNC: 19.3 MMOL/L (ref 23–29)
HCT VFR BLD AUTO: 29.6 % (ref 36–48)
HGB BLD-MCNC: 10 G/DL (ref 12–16)
HGB UR QL STRIP.AUTO: ABNORMAL
KETONES UR STRIP.AUTO-MCNC: 15 MG/DL
LEUKOCYTE ESTERASE UR QL STRIP.AUTO: NEGATIVE
LYMPHOCYTES # BLD: 0.9 K/UL (ref 1–5.1)
LYMPHOCYTES NFR BLD: 16 %
MCH RBC QN AUTO: 30.2 PG (ref 26–34)
MCHC RBC AUTO-ENTMCNC: 33.8 G/DL (ref 31–36)
MCV RBC AUTO: 89.4 FL (ref 80–100)
METAMYELOCYTES NFR BLD MANUAL: 4 %
METHGB MFR BLDV: 0.3 %
MONOCYTES # BLD: 0.7 K/UL (ref 0–1.3)
MONOCYTES NFR BLD: 12 %
MYELOCYTES NFR BLD MANUAL: 4 %
NEUTROPHILS # BLD: 3.9 K/UL (ref 1.7–7.7)
NEUTROPHILS NFR BLD: 63 %
NITRITE UR QL STRIP.AUTO: POSITIVE
NT-PROBNP SERPL-MCNC: 2989 PG/ML (ref 0–124)
O2 CT VFR BLDV CALC: 11 VOL %
O2 THERAPY: ABNORMAL
PCO2 BLDV: 34.7 MMHG (ref 40–50)
PH BLDV: 7.36 [PH] (ref 7.35–7.45)
PH UR STRIP.AUTO: 5.5 [PH] (ref 5–8)
PLATELET # BLD AUTO: 242 K/UL (ref 135–450)
PLATELET BLD QL SMEAR: ADEQUATE
PMV BLD AUTO: 9.1 FL (ref 5–10.5)
PO2 BLDV: 40.4 MMHG (ref 25–40)
POTASSIUM SERPL-SCNC: 4.8 MMOL/L (ref 3.5–5.1)
PROT SERPL-MCNC: 6.4 G/DL (ref 6.4–8.2)
PROT UR STRIP.AUTO-MCNC: 100 MG/DL
RBC # BLD AUTO: 3.31 M/UL (ref 4–5.2)
RBC #/AREA URNS HPF: ABNORMAL /HPF (ref 0–4)
RENAL EPI CELLS #/AREA UR COMP ASSIST: ABNORMAL /HPF (ref 0–1)
SAO2 % BLDV: 74 %
SLIDE REVIEW: ABNORMAL
SODIUM SERPL-SCNC: 130 MMOL/L (ref 136–145)
SP GR UR STRIP.AUTO: >=1.03 (ref 1–1.03)
TROPONIN, HIGH SENSITIVITY: 35 NG/L (ref 0–14)
TROPONIN, HIGH SENSITIVITY: 37 NG/L (ref 0–14)
UA COMPLETE W REFLEX CULTURE PNL UR: YES
UA DIPSTICK W REFLEX MICRO PNL UR: YES
URN SPEC COLLECT METH UR: ABNORMAL
UROBILINOGEN UR STRIP-ACNC: 1 E.U./DL
WBC # BLD AUTO: 5.5 K/UL (ref 4–11)
WBC #/AREA URNS HPF: ABNORMAL /HPF (ref 0–5)

## 2023-11-24 PROCEDURE — 2580000003 HC RX 258: Performed by: REGISTERED NURSE

## 2023-11-24 PROCEDURE — 96361 HYDRATE IV INFUSION ADD-ON: CPT

## 2023-11-24 PROCEDURE — 6360000004 HC RX CONTRAST MEDICATION: Performed by: REGISTERED NURSE

## 2023-11-24 PROCEDURE — 71046 X-RAY EXAM CHEST 2 VIEWS: CPT

## 2023-11-24 PROCEDURE — 87186 SC STD MICRODIL/AGAR DIL: CPT

## 2023-11-24 PROCEDURE — 87086 URINE CULTURE/COLONY COUNT: CPT

## 2023-11-24 PROCEDURE — 6370000000 HC RX 637 (ALT 250 FOR IP): Performed by: REGISTERED NURSE

## 2023-11-24 PROCEDURE — 70450 CT HEAD/BRAIN W/O DYE: CPT

## 2023-11-24 PROCEDURE — 85025 COMPLETE CBC W/AUTO DIFF WBC: CPT

## 2023-11-24 PROCEDURE — 6360000002 HC RX W HCPCS: Performed by: REGISTERED NURSE

## 2023-11-24 PROCEDURE — 85379 FIBRIN DEGRADATION QUANT: CPT

## 2023-11-24 PROCEDURE — 93005 ELECTROCARDIOGRAM TRACING: CPT | Performed by: REGISTERED NURSE

## 2023-11-24 PROCEDURE — 81001 URINALYSIS AUTO W/SCOPE: CPT

## 2023-11-24 PROCEDURE — 71260 CT THORAX DX C+: CPT

## 2023-11-24 PROCEDURE — 80053 COMPREHEN METABOLIC PANEL: CPT

## 2023-11-24 PROCEDURE — 82803 BLOOD GASES ANY COMBINATION: CPT

## 2023-11-24 PROCEDURE — 84484 ASSAY OF TROPONIN QUANT: CPT

## 2023-11-24 PROCEDURE — 36415 COLL VENOUS BLD VENIPUNCTURE: CPT

## 2023-11-24 PROCEDURE — 99285 EMERGENCY DEPT VISIT HI MDM: CPT

## 2023-11-24 PROCEDURE — 96375 TX/PRO/DX INJ NEW DRUG ADDON: CPT

## 2023-11-24 PROCEDURE — 72125 CT NECK SPINE W/O DYE: CPT

## 2023-11-24 PROCEDURE — 87077 CULTURE AEROBIC IDENTIFY: CPT

## 2023-11-24 PROCEDURE — 83880 ASSAY OF NATRIURETIC PEPTIDE: CPT

## 2023-11-24 RX ORDER — LIDOCAINE 4 G/G
1 PATCH TOPICAL ONCE
Status: COMPLETED | OUTPATIENT
Start: 2023-11-24 | End: 2023-11-25

## 2023-11-24 RX ORDER — MORPHINE SULFATE 4 MG/ML
4 INJECTION, SOLUTION INTRAMUSCULAR; INTRAVENOUS ONCE
Status: COMPLETED | OUTPATIENT
Start: 2023-11-25 | End: 2023-11-25

## 2023-11-24 RX ORDER — HEPARIN SODIUM 10000 [USP'U]/100ML
18 INJECTION, SOLUTION INTRAVENOUS CONTINUOUS
Status: DISCONTINUED | OUTPATIENT
Start: 2023-11-25 | End: 2023-11-25

## 2023-11-24 RX ORDER — HEPARIN SODIUM 1000 [USP'U]/ML
80 INJECTION, SOLUTION INTRAVENOUS; SUBCUTANEOUS PRN
Status: DISCONTINUED | OUTPATIENT
Start: 2023-11-24 | End: 2023-11-28 | Stop reason: HOSPADM

## 2023-11-24 RX ORDER — 0.9 % SODIUM CHLORIDE 0.9 %
1000 INTRAVENOUS SOLUTION INTRAVENOUS ONCE
Status: COMPLETED | OUTPATIENT
Start: 2023-11-24 | End: 2023-11-24

## 2023-11-24 RX ORDER — CIPROFLOXACIN 2 MG/ML
400 INJECTION, SOLUTION INTRAVENOUS ONCE
Status: COMPLETED | OUTPATIENT
Start: 2023-11-24 | End: 2023-11-25

## 2023-11-24 RX ORDER — ONDANSETRON 2 MG/ML
4 INJECTION INTRAMUSCULAR; INTRAVENOUS ONCE
Status: COMPLETED | OUTPATIENT
Start: 2023-11-25 | End: 2023-11-25

## 2023-11-24 RX ORDER — HEPARIN SODIUM 1000 [USP'U]/ML
3100 INJECTION, SOLUTION INTRAVENOUS; SUBCUTANEOUS PRN
Status: DISCONTINUED | OUTPATIENT
Start: 2023-11-24 | End: 2023-11-28 | Stop reason: HOSPADM

## 2023-11-24 RX ORDER — KETOROLAC TROMETHAMINE 15 MG/ML
15 INJECTION, SOLUTION INTRAMUSCULAR; INTRAVENOUS ONCE
Status: COMPLETED | OUTPATIENT
Start: 2023-11-24 | End: 2023-11-24

## 2023-11-24 RX ADMIN — SODIUM CHLORIDE 1000 ML: 9 INJECTION, SOLUTION INTRAVENOUS at 21:35

## 2023-11-24 RX ADMIN — KETOROLAC TROMETHAMINE 15 MG: 15 INJECTION, SOLUTION INTRAMUSCULAR; INTRAVENOUS at 21:35

## 2023-11-24 RX ADMIN — IOPAMIDOL 75 ML: 755 INJECTION, SOLUTION INTRAVENOUS at 22:54

## 2023-11-24 ASSESSMENT — ENCOUNTER SYMPTOMS
ABDOMINAL PAIN: 0
NAUSEA: 0
CHEST TIGHTNESS: 0
WHEEZING: 0
SHORTNESS OF BREATH: 1
STRIDOR: 0

## 2023-11-24 ASSESSMENT — PAIN DESCRIPTION - LOCATION: LOCATION: NECK;HEAD

## 2023-11-24 ASSESSMENT — PAIN DESCRIPTION - DESCRIPTORS: DESCRIPTORS: SHOOTING

## 2023-11-24 ASSESSMENT — LIFESTYLE VARIABLES
HOW OFTEN DO YOU HAVE A DRINK CONTAINING ALCOHOL: NEVER
HOW MANY STANDARD DRINKS CONTAINING ALCOHOL DO YOU HAVE ON A TYPICAL DAY: PATIENT DOES NOT DRINK

## 2023-11-24 ASSESSMENT — PAIN - FUNCTIONAL ASSESSMENT: PAIN_FUNCTIONAL_ASSESSMENT: 0-10

## 2023-11-24 ASSESSMENT — PAIN SCALES - GENERAL: PAINLEVEL_OUTOF10: 9

## 2023-11-25 PROBLEM — I26.99 ACUTE PULMONARY EMBOLISM WITHOUT ACUTE COR PULMONALE, UNSPECIFIED PULMONARY EMBOLISM TYPE (HCC): Status: ACTIVE | Noted: 2023-11-25

## 2023-11-25 PROBLEM — E87.1 HYPONATREMIA: Status: ACTIVE | Noted: 2023-11-25

## 2023-11-25 PROBLEM — R41.0 CONFUSION: Status: ACTIVE | Noted: 2023-11-25

## 2023-11-25 LAB
ANION GAP SERPL CALCULATED.3IONS-SCNC: 11 MMOL/L (ref 3–16)
ANTI-XA UNFRAC HEPARIN: 0.27 IU/ML (ref 0.3–0.7)
ANTI-XA UNFRAC HEPARIN: 0.87 IU/ML (ref 0.3–0.7)
ANTI-XA UNFRAC HEPARIN: >1.1 IU/ML (ref 0.3–0.7)
APTT BLD: 122 SEC (ref 22.7–35.9)
BASOPHILS # BLD: 0 K/UL (ref 0–0.2)
BASOPHILS NFR BLD: 0 %
BUN SERPL-MCNC: 20 MG/DL (ref 7–20)
CALCIUM SERPL-MCNC: 8.3 MG/DL (ref 8.3–10.6)
CHLORIDE SERPL-SCNC: 101 MMOL/L (ref 99–110)
CO2 SERPL-SCNC: 20 MMOL/L (ref 21–32)
CREAT SERPL-MCNC: 1 MG/DL (ref 0.6–1.2)
DEPRECATED RDW RBC AUTO: 15.5 % (ref 12.4–15.4)
EKG ATRIAL RATE: 79 BPM
EKG DIAGNOSIS: NORMAL
EKG P AXIS: 44 DEGREES
EKG P-R INTERVAL: 126 MS
EKG Q-T INTERVAL: 394 MS
EKG QRS DURATION: 78 MS
EKG QTC CALCULATION (BAZETT): 451 MS
EKG R AXIS: -12 DEGREES
EKG T AXIS: 15 DEGREES
EKG VENTRICULAR RATE: 79 BPM
EOSINOPHIL # BLD: 0 K/UL (ref 0–0.6)
EOSINOPHIL NFR BLD: 0 %
GFR SERPLBLD CREATININE-BSD FMLA CKD-EPI: 59 ML/MIN/{1.73_M2}
GLUCOSE BLD-MCNC: 115 MG/DL (ref 70–99)
GLUCOSE BLD-MCNC: 231 MG/DL (ref 70–99)
GLUCOSE BLD-MCNC: 83 MG/DL (ref 70–99)
GLUCOSE BLD-MCNC: 92 MG/DL (ref 70–99)
GLUCOSE SERPL-MCNC: 83 MG/DL (ref 70–99)
HCT VFR BLD AUTO: 27.1 % (ref 36–48)
HGB BLD-MCNC: 8.9 G/DL (ref 12–16)
INR PPP: 1.13 (ref 0.84–1.16)
LACTATE BLDV-SCNC: 1.1 MMOL/L (ref 0.4–1.9)
LYMPHOCYTES # BLD: 1.5 K/UL (ref 1–5.1)
LYMPHOCYTES NFR BLD: 25 %
MCH RBC QN AUTO: 29.7 PG (ref 26–34)
MCHC RBC AUTO-ENTMCNC: 33 G/DL (ref 31–36)
MCV RBC AUTO: 90 FL (ref 80–100)
METAMYELOCYTES NFR BLD MANUAL: 3 %
MONOCYTES # BLD: 0.5 K/UL (ref 0–1.3)
MONOCYTES NFR BLD: 10 %
NEUTROPHILS # BLD: 3.3 K/UL (ref 1.7–7.7)
NEUTROPHILS NFR BLD: 59 %
PERFORMED ON: ABNORMAL
PERFORMED ON: ABNORMAL
PERFORMED ON: NORMAL
PERFORMED ON: NORMAL
PLATELET # BLD AUTO: 211 K/UL (ref 135–450)
PLATELET BLD QL SMEAR: ADEQUATE
PMV BLD AUTO: 8.6 FL (ref 5–10.5)
POTASSIUM SERPL-SCNC: 3.8 MMOL/L (ref 3.5–5.1)
PROTHROMBIN TIME: 14.5 SEC (ref 11.5–14.8)
RBC # BLD AUTO: 3.01 M/UL (ref 4–5.2)
SLIDE REVIEW: ABNORMAL
SODIUM SERPL-SCNC: 132 MMOL/L (ref 136–145)
VARIANT LYMPHS NFR BLD MANUAL: 3 % (ref 0–6)
WBC # BLD AUTO: 5.3 K/UL (ref 4–11)

## 2023-11-25 PROCEDURE — 85520 HEPARIN ASSAY: CPT

## 2023-11-25 PROCEDURE — 96375 TX/PRO/DX INJ NEW DRUG ADDON: CPT

## 2023-11-25 PROCEDURE — 6370000000 HC RX 637 (ALT 250 FOR IP): Performed by: INTERNAL MEDICINE

## 2023-11-25 PROCEDURE — 96366 THER/PROPH/DIAG IV INF ADDON: CPT

## 2023-11-25 PROCEDURE — 85025 COMPLETE CBC W/AUTO DIFF WBC: CPT

## 2023-11-25 PROCEDURE — 94640 AIRWAY INHALATION TREATMENT: CPT

## 2023-11-25 PROCEDURE — 80048 BASIC METABOLIC PNL TOTAL CA: CPT

## 2023-11-25 PROCEDURE — 6360000002 HC RX W HCPCS: Performed by: STUDENT IN AN ORGANIZED HEALTH CARE EDUCATION/TRAINING PROGRAM

## 2023-11-25 PROCEDURE — 85730 THROMBOPLASTIN TIME PARTIAL: CPT

## 2023-11-25 PROCEDURE — 85610 PROTHROMBIN TIME: CPT

## 2023-11-25 PROCEDURE — 1200000000 HC SEMI PRIVATE

## 2023-11-25 PROCEDURE — 94761 N-INVAS EAR/PLS OXIMETRY MLT: CPT

## 2023-11-25 PROCEDURE — 6360000002 HC RX W HCPCS: Performed by: REGISTERED NURSE

## 2023-11-25 PROCEDURE — 99222 1ST HOSP IP/OBS MODERATE 55: CPT | Performed by: STUDENT IN AN ORGANIZED HEALTH CARE EDUCATION/TRAINING PROGRAM

## 2023-11-25 PROCEDURE — 2580000003 HC RX 258: Performed by: INTERNAL MEDICINE

## 2023-11-25 PROCEDURE — 83605 ASSAY OF LACTIC ACID: CPT

## 2023-11-25 PROCEDURE — 96365 THER/PROPH/DIAG IV INF INIT: CPT

## 2023-11-25 PROCEDURE — 36415 COLL VENOUS BLD VENIPUNCTURE: CPT

## 2023-11-25 PROCEDURE — 6360000002 HC RX W HCPCS: Performed by: INTERNAL MEDICINE

## 2023-11-25 PROCEDURE — 87040 BLOOD CULTURE FOR BACTERIA: CPT

## 2023-11-25 PROCEDURE — 93010 ELECTROCARDIOGRAM REPORT: CPT | Performed by: INTERNAL MEDICINE

## 2023-11-25 PROCEDURE — 93306 TTE W/DOPPLER COMPLETE: CPT

## 2023-11-25 RX ORDER — SODIUM CHLORIDE 0.9 % (FLUSH) 0.9 %
5-40 SYRINGE (ML) INJECTION PRN
Status: DISCONTINUED | OUTPATIENT
Start: 2023-11-25 | End: 2023-11-28 | Stop reason: HOSPADM

## 2023-11-25 RX ORDER — ACETAMINOPHEN 650 MG/1
650 SUPPOSITORY RECTAL EVERY 6 HOURS PRN
Status: DISCONTINUED | OUTPATIENT
Start: 2023-11-25 | End: 2023-11-28 | Stop reason: HOSPADM

## 2023-11-25 RX ORDER — ASPIRIN 81 MG/1
81 TABLET ORAL 2 TIMES DAILY
Status: DISCONTINUED | OUTPATIENT
Start: 2023-11-25 | End: 2023-11-27

## 2023-11-25 RX ORDER — SODIUM CHLORIDE 9 MG/ML
INJECTION, SOLUTION INTRAVENOUS PRN
Status: DISCONTINUED | OUTPATIENT
Start: 2023-11-25 | End: 2023-11-28 | Stop reason: HOSPADM

## 2023-11-25 RX ORDER — AMLODIPINE BESYLATE 5 MG/1
5 TABLET ORAL DAILY
Status: DISCONTINUED | OUTPATIENT
Start: 2023-11-25 | End: 2023-11-28 | Stop reason: HOSPADM

## 2023-11-25 RX ORDER — TIZANIDINE 2 MG/1
2 TABLET ORAL EVERY 8 HOURS PRN
COMMUNITY

## 2023-11-25 RX ORDER — AMLODIPINE BESYLATE 5 MG/1
5 TABLET ORAL DAILY
COMMUNITY

## 2023-11-25 RX ORDER — MAGNESIUM HYDROXIDE/ALUMINUM HYDROXICE/SIMETHICONE 120; 1200; 1200 MG/30ML; MG/30ML; MG/30ML
30 SUSPENSION ORAL EVERY 6 HOURS PRN
Status: DISCONTINUED | OUTPATIENT
Start: 2023-11-25 | End: 2023-11-28 | Stop reason: HOSPADM

## 2023-11-25 RX ORDER — HEPARIN SODIUM 10000 [USP'U]/100ML
15 INJECTION, SOLUTION INTRAVENOUS CONTINUOUS
Status: DISPENSED | OUTPATIENT
Start: 2023-11-25 | End: 2023-11-25

## 2023-11-25 RX ORDER — MAGNESIUM SULFATE IN WATER 40 MG/ML
2000 INJECTION, SOLUTION INTRAVENOUS PRN
Status: DISCONTINUED | OUTPATIENT
Start: 2023-11-25 | End: 2023-11-28 | Stop reason: HOSPADM

## 2023-11-25 RX ORDER — POLYETHYLENE GLYCOL 3350 17 G/17G
17 POWDER, FOR SOLUTION ORAL DAILY PRN
Status: DISCONTINUED | OUTPATIENT
Start: 2023-11-25 | End: 2023-11-28 | Stop reason: HOSPADM

## 2023-11-25 RX ORDER — POTASSIUM CHLORIDE 7.45 MG/ML
10 INJECTION INTRAVENOUS PRN
Status: DISCONTINUED | OUTPATIENT
Start: 2023-11-25 | End: 2023-11-28 | Stop reason: HOSPADM

## 2023-11-25 RX ORDER — ACETAMINOPHEN 500 MG
1000 TABLET ORAL EVERY 6 HOURS PRN
Status: ON HOLD | COMMUNITY
End: 2023-11-28 | Stop reason: HOSPADM

## 2023-11-25 RX ORDER — LISINOPRIL 20 MG/1
40 TABLET ORAL DAILY
Status: DISCONTINUED | OUTPATIENT
Start: 2023-11-25 | End: 2023-11-28 | Stop reason: HOSPADM

## 2023-11-25 RX ORDER — TRAMADOL HYDROCHLORIDE 50 MG/1
100 TABLET ORAL EVERY 6 HOURS PRN
Status: ON HOLD | COMMUNITY
End: 2023-11-28 | Stop reason: HOSPADM

## 2023-11-25 RX ORDER — POTASSIUM CHLORIDE 20 MEQ/1
40 TABLET, EXTENDED RELEASE ORAL PRN
Status: DISCONTINUED | OUTPATIENT
Start: 2023-11-25 | End: 2023-11-28 | Stop reason: HOSPADM

## 2023-11-25 RX ORDER — ACETAMINOPHEN 325 MG/1
650 TABLET ORAL EVERY 6 HOURS PRN
Status: DISCONTINUED | OUTPATIENT
Start: 2023-11-25 | End: 2023-11-28 | Stop reason: HOSPADM

## 2023-11-25 RX ORDER — ONDANSETRON 2 MG/ML
4 INJECTION INTRAMUSCULAR; INTRAVENOUS EVERY 6 HOURS PRN
Status: DISCONTINUED | OUTPATIENT
Start: 2023-11-25 | End: 2023-11-28 | Stop reason: HOSPADM

## 2023-11-25 RX ORDER — MELOXICAM 15 MG/1
15 TABLET ORAL DAILY
Status: ON HOLD | COMMUNITY
End: 2023-11-28 | Stop reason: HOSPADM

## 2023-11-25 RX ORDER — PANTOPRAZOLE SODIUM 40 MG/1
40 TABLET, DELAYED RELEASE ORAL
Status: DISCONTINUED | OUTPATIENT
Start: 2023-11-25 | End: 2023-11-28 | Stop reason: HOSPADM

## 2023-11-25 RX ORDER — BUDESONIDE AND FORMOTEROL FUMARATE DIHYDRATE 80; 4.5 UG/1; UG/1
2 AEROSOL RESPIRATORY (INHALATION)
Status: DISCONTINUED | OUTPATIENT
Start: 2023-11-25 | End: 2023-11-28 | Stop reason: HOSPADM

## 2023-11-25 RX ORDER — SODIUM CHLORIDE 9 MG/ML
INJECTION, SOLUTION INTRAVENOUS CONTINUOUS
Status: DISCONTINUED | OUTPATIENT
Start: 2023-11-25 | End: 2023-11-26

## 2023-11-25 RX ORDER — MELOXICAM 15 MG/1
15 TABLET ORAL
Status: DISCONTINUED | OUTPATIENT
Start: 2023-11-25 | End: 2023-11-28 | Stop reason: HOSPADM

## 2023-11-25 RX ORDER — SOLIFENACIN SUCCINATE 5 MG/1
5 TABLET, FILM COATED ORAL DAILY
COMMUNITY

## 2023-11-25 RX ORDER — ASPIRIN 81 MG/1
81 TABLET ORAL 2 TIMES DAILY
Status: ON HOLD | COMMUNITY
End: 2023-11-28 | Stop reason: HOSPADM

## 2023-11-25 RX ORDER — ONDANSETRON 4 MG/1
4 TABLET, ORALLY DISINTEGRATING ORAL EVERY 8 HOURS PRN
Status: DISCONTINUED | OUTPATIENT
Start: 2023-11-25 | End: 2023-11-28 | Stop reason: HOSPADM

## 2023-11-25 RX ORDER — BUPROPION HYDROCHLORIDE 100 MG/1
100 TABLET, EXTENDED RELEASE ORAL EVERY 12 HOURS
Status: DISCONTINUED | OUTPATIENT
Start: 2023-11-25 | End: 2023-11-28 | Stop reason: HOSPADM

## 2023-11-25 RX ORDER — SODIUM CHLORIDE 0.9 % (FLUSH) 0.9 %
5-40 SYRINGE (ML) INJECTION EVERY 12 HOURS SCHEDULED
Status: DISCONTINUED | OUTPATIENT
Start: 2023-11-25 | End: 2023-11-28 | Stop reason: HOSPADM

## 2023-11-25 RX ORDER — ROSUVASTATIN CALCIUM 10 MG/1
20 TABLET, COATED ORAL DAILY
Status: DISCONTINUED | OUTPATIENT
Start: 2023-11-25 | End: 2023-11-28 | Stop reason: HOSPADM

## 2023-11-25 RX ORDER — TROSPIUM CHLORIDE 20 MG/1
20 TABLET, FILM COATED ORAL
Status: DISCONTINUED | OUTPATIENT
Start: 2023-11-25 | End: 2023-11-28 | Stop reason: HOSPADM

## 2023-11-25 RX ADMIN — ONDANSETRON 4 MG: 2 INJECTION INTRAMUSCULAR; INTRAVENOUS at 00:23

## 2023-11-25 RX ADMIN — CIPROFLOXACIN 400 MG: 2 INJECTION, SOLUTION INTRAVENOUS at 00:26

## 2023-11-25 RX ADMIN — SODIUM CHLORIDE: 9 INJECTION, SOLUTION INTRAVENOUS at 20:39

## 2023-11-25 RX ADMIN — ACETAMINOPHEN 650 MG: 325 TABLET ORAL at 20:40

## 2023-11-25 RX ADMIN — HEPARIN SODIUM 3100 UNITS: 1000 INJECTION, SOLUTION INTRAVENOUS; SUBCUTANEOUS at 02:18

## 2023-11-25 RX ADMIN — SODIUM CHLORIDE, PRESERVATIVE FREE 10 ML: 5 INJECTION INTRAVENOUS at 20:41

## 2023-11-25 RX ADMIN — PANTOPRAZOLE SODIUM 40 MG: 40 TABLET, DELAYED RELEASE ORAL at 09:43

## 2023-11-25 RX ADMIN — LEVOTHYROXINE SODIUM 75 MCG: 25 TABLET ORAL at 09:43

## 2023-11-25 RX ADMIN — ASPIRIN 81 MG: 81 TABLET, COATED ORAL at 09:40

## 2023-11-25 RX ADMIN — ACETAMINOPHEN 650 MG: 325 TABLET ORAL at 03:55

## 2023-11-25 RX ADMIN — AMLODIPINE BESYLATE 5 MG: 5 TABLET ORAL at 09:40

## 2023-11-25 RX ADMIN — MORPHINE SULFATE 4 MG: 4 INJECTION, SOLUTION INTRAMUSCULAR; INTRAVENOUS at 00:23

## 2023-11-25 RX ADMIN — APIXABAN 10 MG: 5 TABLET, FILM COATED ORAL at 20:40

## 2023-11-25 RX ADMIN — Medication 2 PUFF: at 08:21

## 2023-11-25 RX ADMIN — Medication 2 PUFF: at 20:32

## 2023-11-25 RX ADMIN — SODIUM CHLORIDE: 9 INJECTION, SOLUTION INTRAVENOUS at 02:49

## 2023-11-25 RX ADMIN — ASPIRIN 81 MG: 81 TABLET, COATED ORAL at 20:40

## 2023-11-25 RX ADMIN — TROSPIUM CHLORIDE 20 MG: 20 TABLET, FILM COATED ORAL at 17:40

## 2023-11-25 RX ADMIN — BUPROPION HYDROCHLORIDE 100 MG: 100 TABLET, FILM COATED, EXTENDED RELEASE ORAL at 20:40

## 2023-11-25 RX ADMIN — HEPARIN SODIUM 18 UNITS/KG/HR: 10000 INJECTION, SOLUTION INTRAVENOUS at 00:46

## 2023-11-25 RX ADMIN — HEPARIN SODIUM 17 UNITS/KG/HR: 10000 INJECTION, SOLUTION INTRAVENOUS at 11:44

## 2023-11-25 RX ADMIN — LISINOPRIL 40 MG: 20 TABLET ORAL at 09:40

## 2023-11-25 RX ADMIN — ALUMINUM HYDROXIDE, MAGNESIUM HYDROXIDE, AND SIMETHICONE 30 ML: 200; 200; 20 SUSPENSION ORAL at 17:43

## 2023-11-25 RX ADMIN — ROSUVASTATIN CALCIUM 20 MG: 10 TABLET, FILM COATED ORAL at 09:40

## 2023-11-25 RX ADMIN — ONDANSETRON 4 MG: 2 INJECTION INTRAMUSCULAR; INTRAVENOUS at 13:47

## 2023-11-25 RX ADMIN — BUPROPION HYDROCHLORIDE 100 MG: 100 TABLET, FILM COATED, EXTENDED RELEASE ORAL at 09:40

## 2023-11-25 ASSESSMENT — PAIN DESCRIPTION - ONSET
ONSET: ON-GOING

## 2023-11-25 ASSESSMENT — PAIN SCALES - GENERAL
PAINLEVEL_OUTOF10: 6
PAINLEVEL_OUTOF10: 6
PAINLEVEL_OUTOF10: 7
PAINLEVEL_OUTOF10: 7

## 2023-11-25 ASSESSMENT — PAIN DESCRIPTION - PAIN TYPE
TYPE: SURGICAL PAIN

## 2023-11-25 ASSESSMENT — PAIN DESCRIPTION - FREQUENCY
FREQUENCY: INTERMITTENT
FREQUENCY: CONTINUOUS
FREQUENCY: CONTINUOUS

## 2023-11-25 ASSESSMENT — PAIN - FUNCTIONAL ASSESSMENT
PAIN_FUNCTIONAL_ASSESSMENT: PREVENTS OR INTERFERES SOME ACTIVE ACTIVITIES AND ADLS

## 2023-11-25 ASSESSMENT — PAIN DESCRIPTION - ORIENTATION
ORIENTATION: RIGHT

## 2023-11-25 ASSESSMENT — PAIN DESCRIPTION - LOCATION
LOCATION: HIP
LOCATION: HIP;LEG
LOCATION: LEG
LOCATION: LEG;NECK

## 2023-11-25 ASSESSMENT — PAIN DESCRIPTION - DESCRIPTORS
DESCRIPTORS: ACHING;DISCOMFORT
DESCRIPTORS: DISCOMFORT
DESCRIPTORS: DISCOMFORT
DESCRIPTORS: ACHING

## 2023-11-25 NOTE — PROGRESS NOTES
Brief progress note. Patient seen by nocturnist early this morning please see his H&P. Patient seen and examined. Chart reviewed    Patient is s/p right hip replacement, admitted with acute PE. On heparin drip. I will transition to Eliquis.    GERD- on PPI   Echo reviewed  Pt, ot eval.  DC planning for home on Monday

## 2023-11-25 NOTE — PROGRESS NOTES
4 Eyes Skin Assessment     NAME:  Caryle Ferrari  YOB: 1950  MEDICAL RECORD NUMBER:  [id number]    The patient is being assessed for  Admission    I agree that at least one RN has performed a thorough Head to Toe Skin Assessment on the patient. ALL assessment sites listed below have been assessed. Areas assessed by both nurses:    Head, Face, Ears, Shoulders, Back, Chest, Arms, Elbows, Hands, Sacrum. Buttock, Coccyx, Ischium, Legs. Feet and Heels, and Under Medical Devices         Surgical dressing dry/intact to right hip. Bruising to right hip & scattered. Does the Patient have a Wound?  No noted wound(s)       Salo Prevention initiated by RN: Yes  Wound Care Orders initiated by RN: No    Pressure Injury (Stage 3,4, Unstageable, DTI, NWPT, and Complex wounds) if present, place Wound referral order by RN under : No    New Ostomies, if present place, Ostomy referral order under : No     Nurse 1 eSignature: Electronically signed by Sen Webster RN on 11/25/23 at 4:21 AM EST    **SHARE this note so that the co-signing nurse can place an eSignature**    Nurse 2 eSignature: Electronically signed by Aide Pickett RN on 11/25/23 at 5:00 AM EST

## 2023-11-25 NOTE — PROGRESS NOTES
Admitted from ER to -1 in stable condition. VSS. Continuous pulse ox mid/high 90%'s on RA. Admission completed. Orientation done. Call in easy reach. Bed alarm on for safety. Patient is able to demonstrate the ability to move from a reclining position to an upright position within the recliner. Resting quietly in bed. Instructed not to get out of bed without assistance and patient verbalized good understanding. Continue to monitor closely.   Veena Blankenship RN

## 2023-11-25 NOTE — PROGRESS NOTES
Pharmacy to Manage Heparin Infusion per [facility]    Dx: Possible PE  Pt wt = 76.3 kg Actual  Baseline aPTT = not drawn until after drip was started  Platelets: 544    High Dose Heparin Infusion  No initial bolus ordered. Heparin infusion started at 18 units/kg/hr (recommended initial max dose 2100 units/hr). Note: Baseline labs were drawn about 45 minutes after drip was started and when those results came back around 2am, ED increased the rate of the heparin drip to 20 units/kg/h based on the anti-Xa result of 0.27 and following the admin instructions of the drip on the [facility]. This was only about an hour and 1/2 after the drip was started, so reflected neither the baseline anti-Xa, nor the steady state value. Upon patient's admission to the floor, I elected to keep the drip at its current rate of 20 units/kg/h and retime the anti-Xa for 6 hours after ED changed the initial rate, which would be 8AM.. Recheck anti-Xa @ 0800. Goal Anti-Xa = 0.3 - 0.7 IU/ml    Pharmacy will continue to monitor per protocol.

## 2023-11-25 NOTE — CONSULTS
CARDIOLOGY CONSULTATION      Patient Name: Gavi Dunaway  Date of admission: 11/24/2023  7:47 PM  Admission Dx: Hyponatremia [E87.1]  Dyspnea on exertion [R06.09]  Elevated troponin [R79.89]  Elevated brain natriuretic peptide (BNP) level [R79.89]  Fat embolism, initial encounter ([address]) [T79. 1XXA]  Urinary tract infection with hematuria, site unspecified [N39.0, R31.9]  Acute pulmonary embolism without acute cor pulmonale, unspecified pulmonary embolism type [facility]) [I26.99]  Requesting Physician: Roma Benavidez MD  Primary Care physician: No primary care provider on file. Reason for Consultation/Chief Complaint: Elevated Trop/PE    History of Present Illness:     Gavi Dunaway is a 68 y.o. patient who presented to the hospital with complaints of shortness of breath, neck pain. Pt had hip replacement on 11/15/23. She reports she developed shortness of breath a few days ago which failed to improve. Also endorses neck pain that is worse with movement. Otherwise only complain is post operative hip pain. No calf pain. Reports SCD in sister at age 59 and father in his 62s. No prior VTE. No known clotting disorder. Never smoker. Occasional etoh. No drugs. Feels better than when she came in. The patient denies chest pain, Denies palpitations, dizziness, near-syncope or nick syncope. Denies paroxysmal nocturnal dyspnea, orthopnea, bendopnea, increasing lower extremity edema or weight gain. Past Medical History:   has a past medical history of Asthma, Depression, Hyperlipidemia, Hypertension, Overactive bladder, and Thyroid disease. Surgical History:   has a past surgical history that includes hip surgery. Social History:   reports that she has never smoked. She has never used smokeless tobacco. She reports current alcohol use. She reports that she does not use drugs. Family History:  family history is not on file.       Home Medications:  Were reviewed and are listed in nursing record and/or Additional filling defects are present  in the right lower lobe segmental to subsegmental branches. Left lower lobe  appears least involved. Of note, some of the emboli appear to be very low  attenuation suggesting fat. Main pulmonary artery is normal in caliber. No  evidence of right heart strain. Mediastinum: Cardiac chambers are not enlarged and only a trace amount of  pericardial effusion. The thoracic aorta is not aneurysmal and no thoracic  aortic dissection. There is no periaortic hematoma. Mediastinal and hilar  lymph nodes are not enlarged. Lungs/pleura: The lungs are fully inflated. There is no consolidation or  diffuse opacities to indicate pneumonia. There is some linear  atelectasis/scarring at the lingula and left upper lobe. No pleural effusion  or pneumothorax on either side. Some tiny granulomas and scarring in the  lower lungs. Upper Abdomen: Moderate hiatal hernia. The gallbladder is distended but  without mineralized stone. Tiny calcification at the left adrenal gland  would not require further imaging. Soft Tissues/Bones: Osteopenia and degenerative changes down the thoracic  spine. There is no collapse or subluxation. IMPRESSION:  There are filling defects in the pulmonary arterial tree as described above. Some of the emboli appear to have very low attenuation and may represent fat  emboli with recent hip replacement. However there is no pulmonary consolidation or diffuse opacities from  pneumonia, infarct, or edema. Some linear atelectasis/scarring is noted at  the lingula to left upper lobe. Moderate hiatal hernia. CT Head w/o contrast  IMPRESSION:  No acute intracranial hemorrhage, mass effect, or midline shift. No acute  territorial infarct is identified. There are chronic ischemic changes in the white matter. Areas of low  attenuation along the basal ganglia nuclei and margin of the thalami may  represent lacunar infarcts of unknown age.

## 2023-11-25 NOTE — PLAN OF CARE
Problem: Discharge Planning  Goal: Discharge to home or other facility with appropriate resources  Recent Flowsheet Documentation  Taken 11/25/2023 0935 by Pattie Crouch RN  Discharge to home or other facility with appropriate resources:   Identify barriers to discharge with patient and caregiver   Arrange for needed discharge resources and transportation as appropriate   Identify discharge learning needs (meds, wound care, etc)   Arrange for interpreters to assist at discharge as needed   Refer to discharge planning if patient needs post-hospital services based on physician order or complex needs related to functional status, cognitive ability or social support system     Problem: Pain  Goal: Verbalizes/displays adequate comfort level or baseline comfort level  Recent Flowsheet Documentation  Taken 11/25/2023 0915 by Pattie Crouch RN  Verbalizes/displays adequate comfort level or baseline comfort level:   Encourage patient to monitor pain and request assistance   Assess pain using appropriate pain scale   Administer analgesics based on type and severity of pain and evaluate response   Implement non-pharmacological measures as appropriate and evaluate response   Consider cultural and social influences on pain and pain management   Notify Licensed Independent Practitioner if interventions unsuccessful or patient reports new pain

## 2023-11-25 NOTE — ED PROVIDER NOTES
I independently reviewed the ECG as follows:    Sinus rhythm at a rate of 79 without ectopy. Normal axis and intervals. No evidence of acute ischemia. Low voltage complexes throughout the precordial leads. No prior for comparison    I independently performed a history and physical on Ayana Reed. I have completed a substantive portion of the visit including all aspects of the medical decision making. All diagnostic, treatment, and disposition decisions were made by myself in conjunction with the advanced practice provider/resident. In summary the patient presents with bilateral neck pain which on initial impression would suggest a musculoskeletal etiology however the patient also offers some concern for shortness of breath on a background of recent right hip surgery and her workup has revealed the presence of widespread pulmonary fat emboli versus pulmonary embolism likely contributing to her shortness of breath and perhaps even her neck pain. On examination the patient is alert and conversant no acute distress. She is afebrile hemodynamically stable. Breath sounds are clear she has history of asthma no wheezing no retractions no increased work of breathing. Abdomen is soft nontender nondistended extremities are warm and well-perfused. Workup is reviewed again raise concern for pulmonary embolism versus fat emboli therefore we will start heparin infusion plan for admission for further characterization including comprehensive cardiac ultrasound as she has a mildly elevated troponin and BNP which may represent some evidence of heart strain. She additionally has evidence of urinary tract infection which we will treat. Will proceed with admission patient is agreeable to this disposition. For further details of [facility] emergency department encounter, please see the EULALIO/resident's documentation.       Dominica Krabbe, MD Kingston Crawley, MD  11/25/23 2268

## 2023-11-25 NOTE — ED NOTES
Lab orders acknowledged in [id number] [facility] and lab and weren't collected.  Labs collected and lab notified      Barbara Raman RN  11/25/23 5102       Barbara Raman RN  11/25/23 2709

## 2023-11-25 NOTE — PROGRESS NOTES
Heparin Protocol: Anti-Xa result from 1630 is slightly high at 0.87IU/mL. Decrease Heparin Gtt to 15units/kg/hr and stop at 2021 this evening per existing order to begin Eliquis dosing at 2100.   JENNIFER [facility] PharmD 11/25/2023 5:00 PM

## 2023-11-25 NOTE — H&P
V2.0  History and Physical      Name:  Oral Parmar /Age/Sex: 1950  (78 y.o. female)   MRN & CSN:  [id number] & [id number] Encounter Date/Time: 2023 2:13 AM EST   Location:   PCP: No primary care provider on file. Hospital Day: 2    Assessment and Plan:   Oral Parmar is a 68 y.o. female with a pmh of asthma, hypertension, hypothyroidism, and mitral valve prolapse who presents with shortness of breath and altered mental status. Hospital Problems             Last Modified POA    * (Principal) Acute pulmonary embolism without acute cor pulmonale, unspecified pulmonary embolism type ([address]) 2023 Yes    Confusion 2023 Yes    Hyponatremia 2023 Yes         Principal Problem:    Acute pulmonary embolism without acute cor pulmonale, unspecified pulmonary embolism type ([address])  CT scan did not report right heart strain. However, BNP was significantly elevated and troponin slightly elevated both of which might be from right heart strain. CT study read as pulmonary embolism and/or fat emboli. I would think it is too far out from her procedure for to be fat emboli. Decision was made to treat with heparin. Plan:   Weight-based heparin protocol  Echocardiogram to evaluate for right heart strain  If right heart strain present may be a candidate for mechanical thrombectomy    Active Problems:    Confusion  Possibly due to tramadol and tizanidine but also might be secondary to the hyponatremia. Plan:   Since the tramadol and tizanidine were discontinued she has notably improved. We will simply continue to observe. Hyponatremia  Serum sodium 130 today. Prior values are not available for comparison. Patient has had decreased oral intake suggesting that this may be secondary to hypovolemia and dehydration. Patient has already received a liter of IV fluids in the emergency room.   Plan:   Normal saline at 75 MLS per hour  Follow BMP daily till resolved           Disposition:

## 2023-11-25 NOTE — PROGRESS NOTES
Telemetry Assignment Communication Form    Patient has orders for continuous telemetry OR pulse oximeter only orders    To be filled out by Clinical    Patient has Admission or Transfer orders and is assigned to Room Number: 305      (Once this top section is completed:  Select \"Route\" and send note to Fax number: [phone] ))      ___________________________________________________________________________      [facility]    Patient assigned to tele box number: __________________               (to be written in by [address] when telemetry box is assigned to patient)    ___________________________________________________________________________      Bedside RN confirming that the box listed above is in fact the telemetry box number being placed on the patient listed above.         X________________________________________ RN signature        __________________________________________RN assigned to Patient (please print)    _______________ Date    ____________ Time

## 2023-11-25 NOTE — PROGRESS NOTES
Pharmacy to Manage Heparin Infusion per [facility]      OGXL-OR->2.29  Hold Heparin infusion x 1 hr  Restart @ 17units/kg/hr at 10:30.   Next [id number]    Reddy Marrero RP

## 2023-11-26 LAB
ANION GAP SERPL CALCULATED.3IONS-SCNC: 11 MMOL/L (ref 3–16)
BASOPHILS # BLD: 0 K/UL (ref 0–0.2)
BASOPHILS NFR BLD: 0 %
BUN SERPL-MCNC: 17 MG/DL (ref 7–20)
CALCIUM SERPL-MCNC: 8.3 MG/DL (ref 8.3–10.6)
CHLORIDE SERPL-SCNC: 102 MMOL/L (ref 99–110)
CO2 SERPL-SCNC: 20 MMOL/L (ref 21–32)
CREAT SERPL-MCNC: 1 MG/DL (ref 0.6–1.2)
DEPRECATED RDW RBC AUTO: 15.6 % (ref 12.4–15.4)
EOSINOPHIL # BLD: 0 K/UL (ref 0–0.6)
EOSINOPHIL NFR BLD: 0 %
GFR SERPLBLD CREATININE-BSD FMLA CKD-EPI: 59 ML/MIN/{1.73_M2}
GLUCOSE SERPL-MCNC: 98 MG/DL (ref 70–99)
HCT VFR BLD AUTO: 25.2 % (ref 36–48)
HGB BLD-MCNC: 8.6 G/DL (ref 12–16)
LYMPHOCYTES # BLD: 1.1 K/UL (ref 1–5.1)
LYMPHOCYTES NFR BLD: 18 %
MCH RBC QN AUTO: 30 PG (ref 26–34)
MCHC RBC AUTO-ENTMCNC: 34 G/DL (ref 31–36)
MCV RBC AUTO: 88.4 FL (ref 80–100)
METAMYELOCYTES NFR BLD MANUAL: 1 %
MONOCYTES # BLD: 0.4 K/UL (ref 0–1.3)
MONOCYTES NFR BLD: 8 %
NEUTROPHILS # BLD: 4 K/UL (ref 1.7–7.7)
NEUTROPHILS NFR BLD: 71 %
PLATELET # BLD AUTO: 229 K/UL (ref 135–450)
PLATELET BLD QL SMEAR: ADEQUATE
PMV BLD AUTO: 8.4 FL (ref 5–10.5)
POTASSIUM SERPL-SCNC: 4.2 MMOL/L (ref 3.5–5.1)
RBC # BLD AUTO: 2.86 M/UL (ref 4–5.2)
SLIDE REVIEW: ABNORMAL
SODIUM SERPL-SCNC: 133 MMOL/L (ref 136–145)
VARIANT LYMPHS NFR BLD MANUAL: 2 % (ref 0–6)
WBC # BLD AUTO: 5.6 K/UL (ref 4–11)

## 2023-11-26 PROCEDURE — 97161 PT EVAL LOW COMPLEX 20 MIN: CPT

## 2023-11-26 PROCEDURE — 85025 COMPLETE CBC W/AUTO DIFF WBC: CPT

## 2023-11-26 PROCEDURE — 94761 N-INVAS EAR/PLS OXIMETRY MLT: CPT

## 2023-11-26 PROCEDURE — 97530 THERAPEUTIC ACTIVITIES: CPT

## 2023-11-26 PROCEDURE — 6370000000 HC RX 637 (ALT 250 FOR IP): Performed by: INTERNAL MEDICINE

## 2023-11-26 PROCEDURE — 94640 AIRWAY INHALATION TREATMENT: CPT

## 2023-11-26 PROCEDURE — 97166 OT EVAL MOD COMPLEX 45 MIN: CPT

## 2023-11-26 PROCEDURE — 6360000002 HC RX W HCPCS: Performed by: INTERNAL MEDICINE

## 2023-11-26 PROCEDURE — 2580000003 HC RX 258: Performed by: INTERNAL MEDICINE

## 2023-11-26 PROCEDURE — 80048 BASIC METABOLIC PNL TOTAL CA: CPT

## 2023-11-26 PROCEDURE — 1200000000 HC SEMI PRIVATE

## 2023-11-26 PROCEDURE — 36415 COLL VENOUS BLD VENIPUNCTURE: CPT

## 2023-11-26 RX ORDER — LIDOCAINE 4 G/G
1 PATCH TOPICAL DAILY
Status: DISCONTINUED | OUTPATIENT
Start: 2023-11-26 | End: 2023-11-28 | Stop reason: HOSPADM

## 2023-11-26 RX ORDER — CIPROFLOXACIN 2 MG/ML
400 INJECTION, SOLUTION INTRAVENOUS EVERY 12 HOURS
Status: DISCONTINUED | OUTPATIENT
Start: 2023-11-26 | End: 2023-11-27

## 2023-11-26 RX ORDER — CYCLOBENZAPRINE HCL 10 MG
5 TABLET ORAL 3 TIMES DAILY
Status: DISCONTINUED | OUTPATIENT
Start: 2023-11-26 | End: 2023-11-28 | Stop reason: HOSPADM

## 2023-11-26 RX ORDER — HYDROCODONE BITARTRATE AND ACETAMINOPHEN 5; 325 MG/1; MG/1
1 TABLET ORAL EVERY 6 HOURS PRN
Status: DISCONTINUED | OUTPATIENT
Start: 2023-11-26 | End: 2023-11-28 | Stop reason: HOSPADM

## 2023-11-26 RX ADMIN — MELOXICAM 15 MG: 15 TABLET ORAL at 09:10

## 2023-11-26 RX ADMIN — ACETAMINOPHEN 650 MG: 325 TABLET ORAL at 10:47

## 2023-11-26 RX ADMIN — TROSPIUM CHLORIDE 20 MG: 20 TABLET, FILM COATED ORAL at 14:35

## 2023-11-26 RX ADMIN — BUPROPION HYDROCHLORIDE 100 MG: 100 TABLET, FILM COATED, EXTENDED RELEASE ORAL at 08:25

## 2023-11-26 RX ADMIN — HYDROCODONE BITARTRATE AND ACETAMINOPHEN 1 TABLET: 5; 325 TABLET ORAL at 15:23

## 2023-11-26 RX ADMIN — ASPIRIN 81 MG: 81 TABLET, COATED ORAL at 08:25

## 2023-11-26 RX ADMIN — LEVOTHYROXINE SODIUM 75 MCG: 25 TABLET ORAL at 05:22

## 2023-11-26 RX ADMIN — AMLODIPINE BESYLATE 5 MG: 5 TABLET ORAL at 08:25

## 2023-11-26 RX ADMIN — ACETAMINOPHEN 650 MG: 325 TABLET ORAL at 05:22

## 2023-11-26 RX ADMIN — Medication 2 PUFF: at 08:03

## 2023-11-26 RX ADMIN — ASPIRIN 81 MG: 81 TABLET, COATED ORAL at 20:35

## 2023-11-26 RX ADMIN — ROSUVASTATIN CALCIUM 20 MG: 10 TABLET, FILM COATED ORAL at 08:24

## 2023-11-26 RX ADMIN — APIXABAN 10 MG: 5 TABLET, FILM COATED ORAL at 08:24

## 2023-11-26 RX ADMIN — SODIUM CHLORIDE: 9 INJECTION, SOLUTION INTRAVENOUS at 08:31

## 2023-11-26 RX ADMIN — SODIUM CHLORIDE, PRESERVATIVE FREE 10 ML: 5 INJECTION INTRAVENOUS at 20:36

## 2023-11-26 RX ADMIN — SODIUM CHLORIDE, PRESERVATIVE FREE 10 ML: 5 INJECTION INTRAVENOUS at 09:16

## 2023-11-26 RX ADMIN — PANTOPRAZOLE SODIUM 40 MG: 40 TABLET, DELAYED RELEASE ORAL at 05:22

## 2023-11-26 RX ADMIN — CIPROFLOXACIN 400 MG: 400 INJECTION, SOLUTION INTRAVENOUS at 14:40

## 2023-11-26 RX ADMIN — TROSPIUM CHLORIDE 20 MG: 20 TABLET, FILM COATED ORAL at 05:22

## 2023-11-26 RX ADMIN — BUPROPION HYDROCHLORIDE 100 MG: 100 TABLET, FILM COATED, EXTENDED RELEASE ORAL at 20:35

## 2023-11-26 RX ADMIN — CYCLOBENZAPRINE 5 MG: 10 TABLET, FILM COATED ORAL at 17:12

## 2023-11-26 RX ADMIN — CYCLOBENZAPRINE 5 MG: 10 TABLET, FILM COATED ORAL at 20:35

## 2023-11-26 RX ADMIN — LISINOPRIL 40 MG: 20 TABLET ORAL at 08:25

## 2023-11-26 RX ADMIN — Medication 2 PUFF: at 20:51

## 2023-11-26 RX ADMIN — APIXABAN 10 MG: 5 TABLET, FILM COATED ORAL at 20:35

## 2023-11-26 ASSESSMENT — PAIN DESCRIPTION - ONSET: ONSET: ON-GOING

## 2023-11-26 ASSESSMENT — PAIN DESCRIPTION - LOCATION
LOCATION: NECK
LOCATION: HIP;LEG
LOCATION: NECK

## 2023-11-26 ASSESSMENT — PAIN DESCRIPTION - ORIENTATION: ORIENTATION: RIGHT

## 2023-11-26 ASSESSMENT — PAIN SCALES - GENERAL
PAINLEVEL_OUTOF10: 5
PAINLEVEL_OUTOF10: 7
PAINLEVEL_OUTOF10: 10
PAINLEVEL_OUTOF10: 6
PAINLEVEL_OUTOF10: 2
PAINLEVEL_OUTOF10: 0

## 2023-11-26 ASSESSMENT — PAIN - FUNCTIONAL ASSESSMENT: PAIN_FUNCTIONAL_ASSESSMENT: PREVENTS OR INTERFERES SOME ACTIVE ACTIVITIES AND ADLS

## 2023-11-26 ASSESSMENT — PAIN DESCRIPTION - FREQUENCY: FREQUENCY: CONTINUOUS

## 2023-11-26 ASSESSMENT — PAIN DESCRIPTION - PAIN TYPE: TYPE: SURGICAL PAIN

## 2023-11-26 ASSESSMENT — PAIN DESCRIPTION - DESCRIPTORS: DESCRIPTORS: DISCOMFORT

## 2023-11-26 NOTE — PROGRESS NOTES
Patient tearful at this time due to having a bloody nose, on the table was a few tissues with blood, patient stated it kept bleeding when she was blowing her nose. At this time the blood has stopped.

## 2023-11-26 NOTE — PROGRESS NOTES
Inpatient Physical Therapy Evaluation & Treatment    Unit: PCU  Date:  11/26/2023  Patient Name:    Diana Gardner  Admitting diagnosis:  Hyponatremia [E87.1]  Dyspnea on exertion [R06.09]  Elevated troponin [R79.89]  Elevated brain natriuretic peptide (BNP) level [R79.89]  Fat embolism, initial encounter ([address]) Nguyen Monzon. 1XXA]  Urinary tract infection with hematuria, site unspecified [N39.0, R31.9]  Acute pulmonary embolism without acute cor pulmonale, unspecified pulmonary embolism type ([address]) [I26.99]  Admit Date:  11/24/2023  Precautions/Restrictions/WB Status/ Lines/ Wounds/ Oxygen: Fall risk, Bed/chair alarm, Lines (IV and external catheter), Telemetry, and Continuous pulse oximetry      Pt seen for cotreatment this date due to limited functional status information    Treatment Time:  8:25-9:06  Treatment Number:  1   Timed Code Treatment Minutes: 41 minutes  Total Treatment Minutes:  41  minutes    Patient Stated Goals for Therapy: \" go home \"          Discharge Recommendations: SNF-if refuses will need 25 hour assist  DME needs for discharge: Defer to facility       Therapy recommendation for EMS Transport: can transport by wheelchair    Therapy recommendations for staff:   Assist of 2 for transfers with use of rolling walker (RW) to/from chair    History of Present Illness:   Diana Gardner is a 68 y.o. female with a pmh of asthma, hypertension, hypothyroidism, and mitral valve prolapse who presents with shortness of breath and altered mental status. Patient was recently discharged to home after left total hip replacement. She was prescribed 2 aspirin tablets a day for DVT prophylaxis. Caregiver has discontinued tizanidine and tramadol earlier today and since then mental status has improved somewhat. She reports that for the last several days the patient has not been eating drinking well. There has been no fevers, chills, sweats. Patient has had cervical spine pain.   Emergency room evaluation revealed WFL    Coordination  WNL    Tone  Not Tested    Balance  Static Sitting:  Normal; Modified Independent  Dynamic Sitting:  Not tested; Not Tested   Comments:     Static Standing: Not tested; Not Tested  Dynamic Standing: Not tested; Not Tested  Comments:     Posture  Seated: Forward head and neck and Thoracic kyphosis  Standing: Forward head and neck and Thoracic kyphosis    Bed Mobility   Supine to Sit:    Min A   Sit to Supine:   Min A   Rolling:   Not Tested   Scooting in sitting: CGA   Scooting in supine:  Not Tested   Bridging:  Not Tested    Transfer Training     Sit to stand:   [facility]   Stand to sit:   [facility]   Bed to/from Chair:  Min A  with use of rolling walker (RW)    Gait gait deferred due to intense neck pain where she could not hold her head up and required assitance; pt ambulated 0 ft. Distance:      0 ft  Deviations (firm surface/linoleum):  N/A  Assistive Device Used:    N/A  Level of Assist:    Not Tested   Comment:     Stair Training deferred, pt unsafe/ not appropriate to complete stairs at this time  # of Steps:   N/A  Level of Assist:  Not Tested   UE Support:  NA  Assistive Device:  N/A  Pattern:   N/A  Comments:      Therapeutic Exercises Initiated  deferred secondary to treatment focus on functional mobility  Supine:  N/A    Seated:  N/A    Standing:  N/A    Activity Tolerance   During therapy session noted pt with no adverse symptoms to activity    Pt Position BP (mmHg) HR (bpm) SpO2 (%) on   Comments   Supine at rest       Seated at EOB       Standing       End of session         Positioning Needs   Pt reclined in chair, alarm set, positioned in proper neutral alignment and pressure relief provided. Call light provided and all needs within reach    Other Activities  None. Patient/Family Education   Pt educated on role of inpatient PT, POC, importance of continued activity, DC recommendations, safety awareness, transfer techniques, and calling for assist with mobility.       Assessment  Pt

## 2023-11-26 NOTE — PROGRESS NOTES
AM assessment completed. Scheduled medications given per MAR. VSS room air, A/O x4, patient denies any needs at this time.  Call light in reach, will monitor,

## 2023-11-26 NOTE — PLAN OF CARE
Problem: Discharge Planning  Goal: Discharge to home or other facility with appropriate resources  Outcome: Progressing     Problem: Pain  Goal: Verbalizes/displays adequate comfort level or baseline comfort level  Outcome: Progressing    Problem: Safety - Adult  Goal: Free from fall injury  Outcome: Progressing     Problem: Chronic Conditions and Co-morbidities  Goal: Patient's chronic conditions and co-morbidity symptoms are monitored and maintained or improved  Outcome: Progressing

## 2023-11-26 NOTE — PROGRESS NOTES
Nursing handoff to [facility], [address].   Colton Carey, RN

## 2023-11-26 NOTE — CARE COORDINATION
SW consult noted. Recently discharged from SNF. Unsure if she will return to rehab or go home with [facility]. Per pt request assessment delayed until tomorrow.  +CM following

## 2023-11-26 NOTE — PROGRESS NOTES
Small-to-moderate hiatal hernia. Mild cardiomegaly. VL Extremity Venous Bilateral    (Results Pending)     Echocardiogram   Summary   Left ventricular systolic function is normal with ejection fraction   estimated at 55%. No regional wall motion abnormalities. There is mild concentric left ventricular hypertrophy. Grade I diastolic dysfunction with normal left ventricular filling pressure. The right ventricle is normal in size and function. Trivial mitral regurgitation. IVC size is normal (<2.1cm) and collapses > 50% with respiration consistent   with normal RA pressure . Assessment:  Principal Problem:    Acute pulmonary embolism without acute cor pulmonale, unspecified pulmonary embolism type (HCC)  Active Problems:    Confusion    Hyponatremia  Resolved Problems:    * No resolved hospital problems. *      Plan:     # Acute PE  - Patient is s/p right hip replacement, admitted with acute PE.    -CT scan as above; CT study read as pulmonary embolism and/or fat emboli. Decision was made to treat with heparin.  - was on heparin drip->  transitioned to Eliquis. Has nose bleeds now . Monitor   -Seen by cardiology. Echocardiogram as above-no right heart strain. - 6 months of anticoagulation recommended  -Lower extremity Dopplers pending     # Neck pain  -Appears to be musculoskeletal  -Will order Flexeril, Norco as needed     # S/p recent right hip replacement  -PT OT consulted, will likely need SNF    # GERD  - on PPI     # Acute metabolic encephalopathy  -Reported confusion on admission  -Possibly due to tramadol and tizanidine; possibly also related to dehydration and UTI  -Mental status is clear now. Patient back on muscle relaxants for neck pain monitor mental status    # Hyponatremia  # Hypokalemia dehydration  -Hydrated with normal saline, monitor BMP    # UTI   - Urine culture growing E. Coli.   Start Cipro     Disposition:   Current Living situation: Home, since her hip replacement her friend has been providing her care. PT OT and case management consulted. Will likely need SNF  ADULT DIET; Regular; Low Sodium (2 gm);  Génesis Figueroa MD   11/26/2023 2:54 PM

## 2023-11-26 NOTE — PROGRESS NOTES
Inpatient Occupational Therapy Evaluation and Treatment    Unit: PCU  Date:  11/26/2023  Patient Name:    Jesse El  Admitting diagnosis:  Hyponatremia [E87.1]  Dyspnea on exertion [R06.09]  Elevated troponin [R79.89]  Elevated brain natriuretic peptide (BNP) level [R79.89]  Fat embolism, initial encounter ([address]) Danny Rocher. 1XXA]  Urinary tract infection with hematuria, site unspecified [N39.0, R31.9]  Acute pulmonary embolism without acute cor pulmonale, unspecified pulmonary embolism type ([address]) [I26.99]  Admit Date:  11/24/2023  Precautions/Restrictions/WB Status/ Lines/ Wounds/ Oxygen: Fall risk, Bed/chair alarm, Lines (IV and external catheter), Telemetry, and Continuous pulse oximetry    Pt seen for cotreatment this date due to limited functional status information    Treatment Time:  524-387  Treatment Number:  1  Timed Code Treatment Minutes: 31 minutes  Total Treatment Minutes:  41  minutes    Patient Goals for Therapy: \"figure out this neck pain\"          Discharge Recommendations: SNF-if refuses will need 24 hour assist, [facility]  DME needs for discharge: Defer to facility-if going home will need shower seat and possibly BSC       Therapy recommendations for staff:   Assist of 2 for transfers with use of rolling walker (RW) and gait belt to/from [facility]  to/from chair    History of Present Illness: per Dr Anuja Hidalgo H&P 11/25/23:  \"HESRO Complaint: This of breath and altered mental status     Jesse El is a 68 y.o. female with a pmh of asthma, hypertension, hypothyroidism, and mitral valve prolapse who presents with shortness of breath and altered mental status. Patient was recently discharged to home after left total hip replacement. She was prescribed 2 aspirin tablets a day for DVT prophylaxis. Caregiver has discontinued tizanidine and tramadol earlier today and since then mental status has improved somewhat. She reports that for the last several days the patient has not been eating drinking well. 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 Objective:  Does this pt have an acute or acute on chronic diagnosis of CHF? No    Upper Extremity ROM:    WFL,  pt able to perform all bed mobility, transfers, and gait without ROM limitation. Upper Extremity Strength:    Formal strength testing deferred due to severe neck pain when EOB, focus on getting to chair and cervical support offered by high back recliner    Upper Extremity Sensation:    NT    Upper Extremity Proprioception:  WFL    Coordination:  WFL    Tone:  WFL    Balance:  Sitting:    CGA-OT assisting with supporting head at EOB due to severe pain and cervical flexion in response to pain  Standing:    Min A  and With RW and gait belt    Bed Mobility:   Supine to Sit:    Min A   Sit to Supine:   Not Tested  Rolling:   Not Tested  Scooting in sitting: CGA  Scooting in supine:  Not Tested    Transfers:    Sit to stand:    CGA  Stand to sit:    CGA  Bed to chair:     Min A  and With RW and gait belt  Bed/ chair to standard toilet:  Not Tested  Bed/chair to [facility]:   Not Tested  Functional Mobility:   Not Tested    See PT note for gait analysis. ADLs:  Dressing:      UE:   Not Tested  LE:    Max A don socks    Bathing:    UE:  Not Tested  LE:  Not Tested    Eating:   Independent    Toileting:  Not Tested-purewick in place    Grooming/hygiene: Not Tested    Activity Tolerance:   Pt completed therapy session with pain     BP (mmHg) HR (bpm) SpO2 (%) on RA Comments   Supine at rest       Seated at EOB       Standing       End of session         Positioning Needs:   Pt reclined in chair, alarm set, call light provided and all needs within reach . Ther Ex / Activities Initiated:   N/A    Patient/Family Education:   Pt educated on role of inpatient OT, plan of care, importance of continued activity, DC recommendations, Functional transfer/mobility safety, Transfer techniques, and Calling for assist with mobility.     CHF

## 2023-11-26 NOTE — PLAN OF CARE
Problem: Discharge Planning  Goal: Discharge to home or other facility with appropriate resources  11/26/2023 0753 by Jeremías Patel RN  Outcome: Progressing  11/25/2023 2230 by Vladimir Cloud RN  Outcome: Progressing  Flowsheets (Taken 11/25/2023 0935 by Liborio Taylor RN)  Discharge to home or other facility with appropriate resources:   Identify barriers to discharge with patient and caregiver   Arrange for needed discharge resources and transportation as appropriate   Identify discharge learning needs (meds, wound care, etc)   Arrange for interpreters to assist at discharge as needed   Refer to discharge planning if patient needs post-hospital services based on physician order or complex needs related to functional status, cognitive ability or social support system     Problem: Pain  Goal: Verbalizes/displays adequate comfort level or baseline comfort level  11/26/2023 0753 by Jeremías Patel RN  Outcome: Progressing  11/25/2023 2230 by Vladimir Cloud RN  Outcome: Progressing  Flowsheets  Taken 11/25/2023 1000 by Liborio Taylor RN  Verbalizes/displays adequate comfort level or baseline comfort level:   Encourage patient to monitor pain and request assistance   Assess pain using appropriate pain scale   Administer analgesics based on type and severity of pain and evaluate response   Implement non-pharmacological measures as appropriate and evaluate response   Consider cultural and social influences on pain and pain management   Notify Licensed Independent Practitioner if interventions unsuccessful or patient reports new pain  Taken 11/25/2023 0915 by Liborio Taylor RN  Verbalizes/displays adequate comfort level or baseline comfort level:   Encourage patient to monitor pain and request assistance   Assess pain using appropriate pain scale   Administer analgesics based on type and severity of pain and evaluate response   Implement non-pharmacological measures as appropriate and

## 2023-11-26 NOTE — PROGRESS NOTES
Resting quietly with respirations easy/even on RA. Call in easy reach. Bed alarm on for safety. Continue to monitor closely.   Ryne Bah RN

## 2023-11-27 PROBLEM — R31.9 URINARY TRACT INFECTION WITH HEMATURIA: Status: ACTIVE | Noted: 2023-11-27

## 2023-11-27 PROBLEM — R06.09 DYSPNEA ON EXERTION: Status: ACTIVE | Noted: 2023-11-27

## 2023-11-27 PROBLEM — N39.0 URINARY TRACT INFECTION WITH HEMATURIA: Status: ACTIVE | Noted: 2023-11-27

## 2023-11-27 PROBLEM — R79.89 ELEVATED BRAIN NATRIURETIC PEPTIDE (BNP) LEVEL: Status: ACTIVE | Noted: 2023-11-27

## 2023-11-27 LAB
ANION GAP SERPL CALCULATED.3IONS-SCNC: 10 MMOL/L (ref 3–16)
ANISOCYTOSIS BLD QL SMEAR: ABNORMAL
BACTERIA UR CULT: ABNORMAL
BACTERIA UR CULT: ABNORMAL
BASOPHILS # BLD: 0 K/UL (ref 0–0.2)
BASOPHILS NFR BLD: 0 %
BUN SERPL-MCNC: 16 MG/DL (ref 7–20)
CALCIUM SERPL-MCNC: 8.2 MG/DL (ref 8.3–10.6)
CHLORIDE SERPL-SCNC: 104 MMOL/L (ref 99–110)
CO2 SERPL-SCNC: 21 MMOL/L (ref 21–32)
CREAT SERPL-MCNC: 1.1 MG/DL (ref 0.6–1.2)
DEPRECATED RDW RBC AUTO: 15.8 % (ref 12.4–15.4)
EOSINOPHIL # BLD: 0 K/UL (ref 0–0.6)
EOSINOPHIL NFR BLD: 0 %
GFR SERPLBLD CREATININE-BSD FMLA CKD-EPI: 53 ML/MIN/{1.73_M2}
GLUCOSE SERPL-MCNC: 91 MG/DL (ref 70–99)
HCT VFR BLD AUTO: 22.5 % (ref 36–48)
HGB BLD-MCNC: 7.6 G/DL (ref 12–16)
LYMPHOCYTES # BLD: 0.9 K/UL (ref 1–5.1)
LYMPHOCYTES NFR BLD: 19 %
MCH RBC QN AUTO: 29.7 PG (ref 26–34)
MCHC RBC AUTO-ENTMCNC: 33.8 G/DL (ref 31–36)
MCV RBC AUTO: 87.7 FL (ref 80–100)
METAMYELOCYTES NFR BLD MANUAL: 3 %
MONOCYTES # BLD: 0.6 K/UL (ref 0–1.3)
MONOCYTES NFR BLD: 12 %
MYELOCYTES NFR BLD MANUAL: 3 %
NEUTROPHILS # BLD: 3.4 K/UL (ref 1.7–7.7)
NEUTROPHILS NFR BLD: 61 %
NEUTS BAND NFR BLD MANUAL: 2 % (ref 0–7)
ORGANISM: ABNORMAL
PLATELET # BLD AUTO: 219 K/UL (ref 135–450)
PLATELET BLD QL SMEAR: ADEQUATE
PMV BLD AUTO: 8.3 FL (ref 5–10.5)
POTASSIUM SERPL-SCNC: 4.3 MMOL/L (ref 3.5–5.1)
RBC # BLD AUTO: 2.57 M/UL (ref 4–5.2)
SLIDE REVIEW: ABNORMAL
SODIUM SERPL-SCNC: 135 MMOL/L (ref 136–145)
WBC # BLD AUTO: 4.9 K/UL (ref 4–11)

## 2023-11-27 PROCEDURE — 94640 AIRWAY INHALATION TREATMENT: CPT

## 2023-11-27 PROCEDURE — 6370000000 HC RX 637 (ALT 250 FOR IP): Performed by: INTERNAL MEDICINE

## 2023-11-27 PROCEDURE — 99232 SBSQ HOSP IP/OBS MODERATE 35: CPT | Performed by: INTERNAL MEDICINE

## 2023-11-27 PROCEDURE — 1200000000 HC SEMI PRIVATE

## 2023-11-27 PROCEDURE — 85025 COMPLETE CBC W/AUTO DIFF WBC: CPT

## 2023-11-27 PROCEDURE — 36415 COLL VENOUS BLD VENIPUNCTURE: CPT

## 2023-11-27 PROCEDURE — 94761 N-INVAS EAR/PLS OXIMETRY MLT: CPT

## 2023-11-27 PROCEDURE — 6360000002 HC RX W HCPCS: Performed by: INTERNAL MEDICINE

## 2023-11-27 PROCEDURE — 2580000003 HC RX 258: Performed by: INTERNAL MEDICINE

## 2023-11-27 PROCEDURE — 80048 BASIC METABOLIC PNL TOTAL CA: CPT

## 2023-11-27 RX ORDER — NITROFURANTOIN 25; 75 MG/1; MG/1
100 CAPSULE ORAL EVERY 12 HOURS SCHEDULED
Status: DISCONTINUED | OUTPATIENT
Start: 2023-11-27 | End: 2023-11-28 | Stop reason: HOSPADM

## 2023-11-27 RX ADMIN — LISINOPRIL 40 MG: 20 TABLET ORAL at 10:10

## 2023-11-27 RX ADMIN — APIXABAN 10 MG: 5 TABLET, FILM COATED ORAL at 21:50

## 2023-11-27 RX ADMIN — CYCLOBENZAPRINE 5 MG: 10 TABLET, FILM COATED ORAL at 21:50

## 2023-11-27 RX ADMIN — MELOXICAM 15 MG: 15 TABLET ORAL at 10:10

## 2023-11-27 RX ADMIN — ALUMINUM HYDROXIDE, MAGNESIUM HYDROXIDE, AND SIMETHICONE 30 ML: 200; 200; 20 SUSPENSION ORAL at 16:24

## 2023-11-27 RX ADMIN — CYCLOBENZAPRINE 5 MG: 10 TABLET, FILM COATED ORAL at 10:10

## 2023-11-27 RX ADMIN — ROSUVASTATIN CALCIUM 20 MG: 10 TABLET, FILM COATED ORAL at 10:09

## 2023-11-27 RX ADMIN — TROSPIUM CHLORIDE 20 MG: 20 TABLET, FILM COATED ORAL at 15:43

## 2023-11-27 RX ADMIN — Medication 2 PUFF: at 20:04

## 2023-11-27 RX ADMIN — NITROFURANTOIN MONOHYDRATE/MACROCRYSTALS 100 MG: 75; 25 CAPSULE ORAL at 21:50

## 2023-11-27 RX ADMIN — APIXABAN 10 MG: 5 TABLET, FILM COATED ORAL at 10:09

## 2023-11-27 RX ADMIN — TROSPIUM CHLORIDE 20 MG: 20 TABLET, FILM COATED ORAL at 05:26

## 2023-11-27 RX ADMIN — NITROFURANTOIN MONOHYDRATE/MACROCRYSTALS 100 MG: 75; 25 CAPSULE ORAL at 10:09

## 2023-11-27 RX ADMIN — SODIUM CHLORIDE, PRESERVATIVE FREE 10 ML: 5 INJECTION INTRAVENOUS at 21:52

## 2023-11-27 RX ADMIN — CIPROFLOXACIN 400 MG: 400 INJECTION, SOLUTION INTRAVENOUS at 01:45

## 2023-11-27 RX ADMIN — Medication 2 PUFF: at 07:32

## 2023-11-27 RX ADMIN — BUPROPION HYDROCHLORIDE 100 MG: 100 TABLET, FILM COATED, EXTENDED RELEASE ORAL at 10:09

## 2023-11-27 RX ADMIN — CYCLOBENZAPRINE 5 MG: 10 TABLET, FILM COATED ORAL at 15:43

## 2023-11-27 RX ADMIN — PANTOPRAZOLE SODIUM 40 MG: 40 TABLET, DELAYED RELEASE ORAL at 05:26

## 2023-11-27 RX ADMIN — LEVOTHYROXINE SODIUM 75 MCG: 25 TABLET ORAL at 05:26

## 2023-11-27 RX ADMIN — AMLODIPINE BESYLATE 5 MG: 5 TABLET ORAL at 10:10

## 2023-11-27 RX ADMIN — BUPROPION HYDROCHLORIDE 100 MG: 100 TABLET, FILM COATED, EXTENDED RELEASE ORAL at 21:49

## 2023-11-27 RX ADMIN — HYDROCODONE BITARTRATE AND ACETAMINOPHEN 1 TABLET: 5; 325 TABLET ORAL at 17:55

## 2023-11-27 ASSESSMENT — PAIN DESCRIPTION - LOCATION
LOCATION: NECK
LOCATION: OTHER (COMMENT);HEAD
LOCATION: NECK
LOCATION: NECK

## 2023-11-27 ASSESSMENT — PAIN DESCRIPTION - ORIENTATION
ORIENTATION: RIGHT;LEFT

## 2023-11-27 ASSESSMENT — PAIN DESCRIPTION - DESCRIPTORS
DESCRIPTORS: ACHING;DISCOMFORT;BURNING
DESCRIPTORS: ACHING;DISCOMFORT

## 2023-11-27 ASSESSMENT — PAIN SCALES - GENERAL
PAINLEVEL_OUTOF10: 6
PAINLEVEL_OUTOF10: 3
PAINLEVEL_OUTOF10: 8
PAINLEVEL_OUTOF10: 4

## 2023-11-27 ASSESSMENT — PAIN - FUNCTIONAL ASSESSMENT
PAIN_FUNCTIONAL_ASSESSMENT: PREVENTS OR INTERFERES SOME ACTIVE ACTIVITIES AND ADLS

## 2023-11-27 NOTE — ACP (ADVANCE CARE PLANNING)
Advance Care Planning     General Advance Care Planning (ACP) Conversation    Date of Conversation: 11/24/2023  Conducted with: Patient with Decision Making Capacity    Healthcare Decision Maker:    Primary Decision Maker: Zulay Carlson [facility] - [phone]  Click here to complete Healthcare Decision Makers including selection of the Healthcare Decision Maker Relationship (ie \"Primary\").       Content/Action Overview:    Pt elects Full Code    Length of Voluntary ACP Conversation in minutes:  <16 minutes (Non-Billable)    Lennox Weeks RN

## 2023-11-27 NOTE — CARE COORDINATION
Case Management Assessment  Initial Evaluation    Date/Time of Evaluation: 11/27/2023 1:07 PM  Assessment Completed by: Guerrero Weber RN    If patient is discharged prior to next notation, then this note serves as note for discharge by case management. Patient Name: Sandro Felton                   YOB: 1950  Diagnosis: Hyponatremia [E87.1]  Dyspnea on exertion [R06.09]  Elevated troponin [R79.89]  Elevated brain natriuretic peptide (BNP) level [R79.89]  Fat embolism, initial encounter ([address]) Izzy Eanas. 1XXA]  Urinary tract infection with hematuria, site unspecified [N39.0, R31.9]  Acute pulmonary embolism without acute cor pulmonale, unspecified pulmonary embolism type ([address]) [I26.99]                   Date / Time: 11/24/2023  7:47 PM    Patient Admission Status: Inpatient   Readmission Risk (Low < 19, Mod (19-27), High > 27): Readmission Risk Score: 12.4    Current PCP: No primary care provider on file. PCP verified by CM?  (pt does not have PCP)    Chart Reviewed: Yes      History Provided by: Patient  Patient Orientation: Alert and Oriented    Patient Cognition: Alert    Hospitalization in the last 30 days (Readmission):  No    If yes, Readmission Assessment in CM Navigator will be completed. Advance Directives:      Code Status: Full Code   Patient's Primary Decision Maker is: Legal Next of Kin      Discharge Planning:    Patient lives with: Friends Type of Home: House  Primary Care Giver: Self  Patient Support Systems include: Friends/Neighbors   Current Financial resources: Medicare  Current community resources: None  Current services prior to admission: None            Current DME:              Type of Home Care services:  None    ADLS  Prior functional level:  Independent in ADLs/IADLs  Current functional level: Assistance with the following:, Mobility, Shopping    PT AM-PAC:   /24  OT AM-PAC: 14 /24    Family can provide assistance at DC: No  Would you like Case Management to discuss the

## 2023-11-27 NOTE — DISCHARGE SUMMARY
tablet by mouth 2 times daily  Start taking on: December 2, 2023     HYDROcodone-acetaminophen 5-325 MG per tablet  Commonly known as: NORCO  Take 1 tablet by mouth every 6 hours as needed for Pain for up to 3 days. Max Daily Amount: 4 tablets     nitrofurantoin (macrocrystal-monohydrate) 100 MG capsule  Commonly known as: MACROBID  Take 1 capsule by mouth every 12 hours for 12 doses     polyethylene glycol 17 g packet  Commonly known as: GLYCOLAX  Take 1 packet by mouth daily as needed for Constipation           * This list has 2 medication(s) that are the same as other medications prescribed for you. Read the directions carefully, and ask your doctor or other care provider to review them with you. CONTINUE taking these medications      * albuterol (2.5 MG/3ML) 0.083% nebulizer solution  Commonly known as: PROVENTIL  Take 3 mLs by nebulization every 6 hours as needed for Wheezing     * albuterol sulfate  (90 Base) MCG/ACT inhaler  Commonly known as: PROVENTIL;VENTOLIN;PROAIR     amLODIPine 5 MG tablet  Commonly known as: NORVASC     buPROPion 100 MG extended release tablet  Commonly known as: WELLBUTRIN SR     fluticasone-salmeterol 100-50 MCG/ACT Aepb diskus inhaler  Commonly known as: Advair Diskus  Inhale 1 puff into the lungs in the morning and 1 puff in the evening. Rinse mouth with water after use. LEVOTHYROXINE SODIUM PO     LISINOPRIL PO     omeprazole 20 MG delayed release capsule  Commonly known as: PRILOSEC     Rosuvastatin Calcium 20 MG Cpsp     solifenacin 5 MG tablet  Commonly known as: VESICARE     tiZANidine 2 MG tablet  Commonly known as:             * This list has 2 medication(s) that are the same as other medications prescribed for you. Read the directions carefully, and ask your doctor or other care provider to review them with you.                 STOP taking these medications      acetaminophen 500 MG tablet  Commonly known as: TYLENOL     aspirin 81 MG EC tablet     meloxicam 15 MG tablet  Commonly known as: MOBIC     traMADol 50 MG tablet  Commonly known as: ULTRAM               Where to Get Your Medications        You can get these medications from any pharmacy    Bring a paper prescription for each of these medications  HYDROcodone-acetaminophen 5-325 MG per tablet       Information about where to get these medications is not yet available    Ask your nurse or doctor about these medications  apixaban 5 MG Tabs tablet  apixaban 5 MG Tabs tablet  nitrofurantoin (macrocrystal-monohydrate) 100 MG capsule  polyethylene glycol 17 g packet           Discharge Condition/Location: Stable to NH. Follow Up: Follow up with NH doctor. More than 30 mts spent.     Estefani Rodriguez MD 11/28/2023 9:20 AM

## 2023-11-28 VITALS
RESPIRATION RATE: 18 BRPM | HEART RATE: 97 BPM | SYSTOLIC BLOOD PRESSURE: 110 MMHG | BODY MASS INDEX: 34.56 KG/M2 | DIASTOLIC BLOOD PRESSURE: 62 MMHG | OXYGEN SATURATION: 96 % | WEIGHT: 171.4 LBS | HEIGHT: 59 IN | TEMPERATURE: 98.5 F

## 2023-11-28 LAB
DEPRECATED RDW RBC AUTO: 15.7 % (ref 12.4–15.4)
HCT VFR BLD AUTO: 23.2 % (ref 36–48)
HGB BLD-MCNC: 7.7 G/DL (ref 12–16)
MCH RBC QN AUTO: 29.8 PG (ref 26–34)
MCHC RBC AUTO-ENTMCNC: 33.2 G/DL (ref 31–36)
MCV RBC AUTO: 89.9 FL (ref 80–100)
PLATELET # BLD AUTO: 242 K/UL (ref 135–450)
PMV BLD AUTO: 7.9 FL (ref 5–10.5)
RBC # BLD AUTO: 2.58 M/UL (ref 4–5.2)
WBC # BLD AUTO: 4.8 K/UL (ref 4–11)

## 2023-11-28 PROCEDURE — 94761 N-INVAS EAR/PLS OXIMETRY MLT: CPT

## 2023-11-28 PROCEDURE — 2700000000 HC OXYGEN THERAPY PER DAY

## 2023-11-28 PROCEDURE — 2580000003 HC RX 258: Performed by: INTERNAL MEDICINE

## 2023-11-28 PROCEDURE — 6370000000 HC RX 637 (ALT 250 FOR IP): Performed by: INTERNAL MEDICINE

## 2023-11-28 PROCEDURE — 94640 AIRWAY INHALATION TREATMENT: CPT

## 2023-11-28 PROCEDURE — 36415 COLL VENOUS BLD VENIPUNCTURE: CPT

## 2023-11-28 PROCEDURE — 85027 COMPLETE CBC AUTOMATED: CPT

## 2023-11-28 PROCEDURE — 99239 HOSP IP/OBS DSCHRG MGMT >30: CPT | Performed by: INTERNAL MEDICINE

## 2023-11-28 RX ORDER — NITROFURANTOIN 25; 75 MG/1; MG/1
100 CAPSULE ORAL EVERY 12 HOURS SCHEDULED
Qty: 12 CAPSULE | Refills: 0
Start: 2023-11-28 | End: 2023-12-04

## 2023-11-28 RX ORDER — HYDROCODONE BITARTRATE AND ACETAMINOPHEN 5; 325 MG/1; MG/1
1 TABLET ORAL EVERY 6 HOURS PRN
Qty: 12 TABLET | Refills: 0 | Status: SHIPPED | OUTPATIENT
Start: 2023-11-28 | End: 2023-12-01

## 2023-11-28 RX ORDER — POLYETHYLENE GLYCOL 3350 17 G/17G
17 POWDER, FOR SOLUTION ORAL DAILY PRN
Qty: 527 G | Refills: 0
Start: 2023-11-28 | End: 2023-12-28

## 2023-11-28 RX ADMIN — AMLODIPINE BESYLATE 5 MG: 5 TABLET ORAL at 09:33

## 2023-11-28 RX ADMIN — LISINOPRIL 40 MG: 20 TABLET ORAL at 09:32

## 2023-11-28 RX ADMIN — LEVOTHYROXINE SODIUM 75 MCG: 25 TABLET ORAL at 05:54

## 2023-11-28 RX ADMIN — SODIUM CHLORIDE, PRESERVATIVE FREE 10 ML: 5 INJECTION INTRAVENOUS at 09:33

## 2023-11-28 RX ADMIN — MELOXICAM 15 MG: 15 TABLET ORAL at 09:39

## 2023-11-28 RX ADMIN — ROSUVASTATIN CALCIUM 20 MG: 10 TABLET, FILM COATED ORAL at 09:32

## 2023-11-28 RX ADMIN — Medication 2 PUFF: at 08:24

## 2023-11-28 RX ADMIN — NITROFURANTOIN MONOHYDRATE/MACROCRYSTALS 100 MG: 75; 25 CAPSULE ORAL at 09:32

## 2023-11-28 RX ADMIN — CYCLOBENZAPRINE 5 MG: 10 TABLET, FILM COATED ORAL at 09:32

## 2023-11-28 RX ADMIN — BUPROPION HYDROCHLORIDE 100 MG: 100 TABLET, FILM COATED, EXTENDED RELEASE ORAL at 09:32

## 2023-11-28 RX ADMIN — TROSPIUM CHLORIDE 20 MG: 20 TABLET, FILM COATED ORAL at 05:55

## 2023-11-28 RX ADMIN — PANTOPRAZOLE SODIUM 40 MG: 40 TABLET, DELAYED RELEASE ORAL at 05:55

## 2023-11-28 RX ADMIN — APIXABAN 10 MG: 5 TABLET, FILM COATED ORAL at 09:32

## 2023-11-28 RX ADMIN — ALUMINUM HYDROXIDE, MAGNESIUM HYDROXIDE, AND SIMETHICONE 30 ML: 200; 200; 20 SUSPENSION ORAL at 00:53

## 2023-11-28 ASSESSMENT — PAIN DESCRIPTION - FREQUENCY: FREQUENCY: CONTINUOUS

## 2023-11-28 ASSESSMENT — PAIN DESCRIPTION - PAIN TYPE: TYPE: ACUTE PAIN

## 2023-11-28 ASSESSMENT — PAIN SCALES - GENERAL: PAINLEVEL_OUTOF10: 5

## 2023-11-28 ASSESSMENT — PAIN - FUNCTIONAL ASSESSMENT: PAIN_FUNCTIONAL_ASSESSMENT: PREVENTS OR INTERFERES WITH MANY ACTIVE NOT PASSIVE ACTIVITIES

## 2023-11-28 ASSESSMENT — PAIN DESCRIPTION - DESCRIPTORS: DESCRIPTORS: ACHING;DISCOMFORT

## 2023-11-28 ASSESSMENT — PAIN DESCRIPTION - LOCATION: LOCATION: NECK

## 2023-11-28 ASSESSMENT — PAIN DESCRIPTION - ORIENTATION: ORIENTATION: RIGHT;LEFT

## 2023-11-28 NOTE — DISCHARGE INSTR - COC
Continuity of Care Form    Patient Name: Neto Bolivar   :  1950  MRN:  [id number]    Admit date:  2023  Discharge date:  2023      Code Status Order: Full Code   Advance Directives:     Admitting Physician:  Pavan Benz MD  PCP: No primary care provider on file. Discharging Nurse: Freddie Echols RN    [facility] Unit/Room#: /6322-14  Discharging Unit Phone Number: [phone]      Emergency Contact:   Extended Emergency Contact Information  Primary Emergency Contact: [address] Phone: [phone]  Work Phone: [phone]  Mobile Phone: [phone]  Relation: Other   needed?  No    Past Surgical History:  Past Surgical History:   Procedure Laterality Date    HIP SURGERY         Immunization History:   Immunization History   Administered Date(s) Administered    COVID-19, PFIZER PURPLE top, DILUTE for use, (age 15 y+), 30mcg/0.3mL 03/15/2021, 2021, 2021, 2022    TDaP, ADACEL (age 6y-58y), [employer] Meals (age 10y+), IM, 0.5mL 2019       Active Problems:  Patient Active Problem List   Diagnosis Code    Moderate persistent asthma without complication S06.67    Diastolic dysfunction F28.45    Elevated blood pressure reading R03.0    Class 1 obesity in adult E66.9    FLAQUITA (obstructive sleep apnea) G47.33    Acute pulmonary embolism without acute cor pulmonale, unspecified pulmonary embolism type ([address]) I26.99    Confusion R41.0    Hyponatremia E87.1    Dyspnea on exertion R06.09    Elevated brain natriuretic peptide (BNP) level R79.89    Urinary tract infection with hematuria N39.0, R31.9       Isolation/Infection:   Isolation            Contact          Patient Infection Status       Infection Onset Added Last Indicated Last Indicated By Review Planned Expiration Resolved Resolved By    ESBL (Extended Spectrum Beta Lactamase) 23 Culture, Urine                Nurse Assessment:  Last Vital Signs: /62   Pulse 93 Assessment Score:  Readmission Risk              Risk of Unplanned Readmission:  13           Discharging to Facility/ Agency   Name: Name: [address] [place], [place], [facility]  Phone: [phone]  Fax: [phone]       / signature: Electronically signed by Jamari Angel RN on 11/28/23 at 1:23 PM EST    PHYSICIAN SECTION    Prognosis: Good    Condition at Discharge: Stable    Rehab Potential (if transferring to Rehab): Good    Recommended Labs or Other Treatments After Discharge: PT and OT    Physician Certification: I certify the above information and transfer of Muriel Pedro  is necessary for the continuing treatment of the diagnosis listed and that she requires [address] for less 30 days.      Update Admission H&P: No change in H&P    PHYSICIAN SIGNATURE:  Electronically signed by Vitor Garrido MD on 11/28/23 at 9:22 AM EST

## 2023-11-28 NOTE — CONSULTS
DISCHARGE ORDER  Date/Time 2023 1:03 PM  Completed by: Lennox Weeks RN, Case Management    Patient Name: Ayana Reed    : 1950      Admit order Date and Status:inpt, 2023  Noted discharge order. (verify MD's last order for status of admission/Traditional Medicare 3 [place] Inpatient qualifying stay required for SNF)    Confirmed discharge plan with:              Patient:  Yes              When pt confirms DC plan does any support person need to be contacted by CM No                      Discharge to Facility: [facility] phone number for staff giving report: [phone] completed: [facility] Exemption Notification (HENS) completed: yes   Discharge orders and Continuity of Care faxed to facility:  yes      Transportation:               Medical Transport explained with choice list offered to pt/family. Choice:No(no preference)  Agency used:    time:         Pt/family/Nursing/Facility aware of  time: yes  Yes Names: pt, Zainab Garcia from [facility] and bedside nurse. Ambulance form completed:  yes:      Date Last IMM Given: 2023    Comments:Reviewed chart. Writer spoke with Zainab Garcia from [facility] and she can accept the pt at d/c today. Pt is being d/c'd to SNF today. Pt's O2 sats are 96% on RA. Discharge timeout done with LI Ferrera. All discharge needs and concerns addressed. Discharging nurse to complete HARLEY, reconcile AVS, and place final copy with patient's discharge packet. Discharging RN to ensure that written prescriptions for  Level II medications are sent with patient to the facility as per protocol.

## 2023-11-28 NOTE — PROGRESS NOTES
Patient educated on discharge instructions as well as new medications use, dosage, administration and possible side effects. Patient verified knowledge. IV removed without difficulty and dry dressing in place. Telemetry monitor removed and returned to [facility]. Pt left facility in stable condition to Skilled nursing facility with all of their personal belongings. Called and gave report nurse [facility] [place]. Addressed all nursing and patient concerns. Pt stable.     Ramonita Mcnair RN

## 2023-11-29 LAB
BACTERIA BLD CULT ORG #2: NORMAL
BACTERIA BLD CULT: NORMAL

## 2023-12-05 NOTE — PROGRESS NOTES
Physician Progress Note      Marla Navarrete  [facility] #:                  [id number]  :                       1950  ADMIT DATE:       2023 7:47 PM  [facility] DATE:        2023 2:15 PM  RESPONDING  PROVIDER #:        Shani Cano MD          QUERY TEXT:    Patient presented with shortness of breath and was diagnosed with a pulmonary   embolism. Patient is status post total hip replacement 9 days ago. Patient   was treated with IV Heparin. After further review, was the pulmonary embolism   directly related to the procedure: The medical record reflects the following:  Risk Factors: PE, post total hip replacement 9 days ago    Clinical Indicators: H&P-  Acute pulmonary embolism without acute cor   pulmonale, unspecified pulmonary embolism type (HCC)  CT scan did not report right heart strain. However, BNP was significantly   elevated and troponin slightly elevated both of which might be from right   heart strain. CT study read as pulmonary embolism and/or fat emboli. I would   think it is too far out from her procedure for to be fat emboli. Decision   was made to treat with heparin. Treatment: treated with IV Heparin    Thank You Alise Killian RN, [email or web address]  Options provided:  -- Pulmonary embolism is due to the procedure  -- Pulmonary embolism is not due to the procedure, but is due to, please   document. -- Other - I will add my own diagnosis  -- Disagree - Not applicable / Not valid  -- Disagree - Clinically unable to determine / Unknown  -- Refer to Clinical Documentation Reviewer    PROVIDER RESPONSE TEXT:    Patient has pulmonary embolism due to the procedure.     Query created by: Alise Killian on 2023 2:53 PM      Electronically signed by:  Shani Cano MD 2023 12:50 PM

## 2024-02-26 NOTE — PROGRESS NOTES
Bedside report and transfer of care given to [facility]. Pt currently resting in bed with the call light within reach. Pt denies any other care needs at this time. Pt stable at this time.       Jm Prince RN Please resend

## 2024-03-05 NOTE — ED ADULT NURSE NOTE - NS ED NURSE LEVEL OF CONSCIOUSNESS MENTAL STATUS
Abdomen , soft, nontender, non-tense ascites, no guarding or rigidity , no masses palpable , normal bowel sounds , Liver and Spleen , no hepatomegaly present , no hepatosplenomegaly , liver nontender , spleen not palpable 
Alert/Cooperative/Awake
